# Patient Record
Sex: FEMALE | Race: WHITE | Employment: OTHER
[De-identification: names, ages, dates, MRNs, and addresses within clinical notes are randomized per-mention and may not be internally consistent; named-entity substitution may affect disease eponyms.]

---

## 2017-01-04 ENCOUNTER — TELEPHONE (OUTPATIENT)
Dept: INTERNAL MEDICINE | Facility: CLINIC | Age: 82
End: 2017-01-04

## 2017-01-04 ENCOUNTER — OFFICE VISIT (OUTPATIENT)
Dept: INTERNAL MEDICINE | Facility: CLINIC | Age: 82
End: 2017-01-04

## 2017-01-04 VITALS
HEART RATE: 79 BPM | HEIGHT: 65 IN | BODY MASS INDEX: 19.66 KG/M2 | OXYGEN SATURATION: 97 % | DIASTOLIC BLOOD PRESSURE: 93 MMHG | SYSTOLIC BLOOD PRESSURE: 159 MMHG | WEIGHT: 118 LBS | RESPIRATION RATE: 12 BRPM

## 2017-01-04 DIAGNOSIS — I48.20 CHRONIC ATRIAL FIBRILLATION (HCC): ICD-10-CM

## 2017-01-04 DIAGNOSIS — I10 ESSENTIAL HYPERTENSION: ICD-10-CM

## 2017-01-04 DIAGNOSIS — I50.42 CHRONIC COMBINED SYSTOLIC AND DIASTOLIC CONGESTIVE HEART FAILURE (HCC): Primary | ICD-10-CM

## 2017-01-04 PROCEDURE — 99214 OFFICE O/P EST MOD 30 MIN: CPT | Performed by: INTERNAL MEDICINE

## 2017-01-04 RX ORDER — DIGOXIN 125 MCG
0.06 TABLET ORAL DAILY
Qty: 90 TABLET | Refills: 0
Start: 2017-01-04 | End: 2017-01-27 | Stop reason: DRUGHIGH

## 2017-01-04 ASSESSMENT — ENCOUNTER SYMPTOMS: SHORTNESS OF BREATH: 1

## 2017-01-26 ENCOUNTER — CARE COORDINATION (OUTPATIENT)
Dept: CARE COORDINATION | Facility: CLINIC | Age: 82
End: 2017-01-26

## 2017-01-27 ENCOUNTER — CARE COORDINATION (OUTPATIENT)
Dept: CARE COORDINATION | Facility: CLINIC | Age: 82
End: 2017-01-27

## 2017-01-27 RX ORDER — WARFARIN SODIUM 1 MG/1
1 TABLET ORAL
COMMUNITY
End: 2018-06-06 | Stop reason: ALTCHOICE

## 2017-01-27 RX ORDER — FUROSEMIDE 20 MG/1
20 TABLET ORAL DAILY
COMMUNITY
End: 2017-10-17 | Stop reason: SDUPTHER

## 2017-01-27 RX ORDER — LOSARTAN POTASSIUM 25 MG/1
25 TABLET ORAL DAILY
COMMUNITY
End: 2018-06-06 | Stop reason: ALTCHOICE

## 2017-01-27 RX ORDER — DIGOXIN 125 MCG
125 TABLET ORAL
COMMUNITY
End: 2017-03-14 | Stop reason: SDUPTHER

## 2017-01-30 ENCOUNTER — OFFICE VISIT (OUTPATIENT)
Dept: INTERNAL MEDICINE | Facility: CLINIC | Age: 82
End: 2017-01-30

## 2017-01-30 VITALS
DIASTOLIC BLOOD PRESSURE: 89 MMHG | BODY MASS INDEX: 19.86 KG/M2 | WEIGHT: 119.2 LBS | HEIGHT: 65 IN | HEART RATE: 93 BPM | OXYGEN SATURATION: 98 % | RESPIRATION RATE: 12 BRPM | SYSTOLIC BLOOD PRESSURE: 145 MMHG

## 2017-01-30 DIAGNOSIS — I10 ESSENTIAL HYPERTENSION: ICD-10-CM

## 2017-01-30 DIAGNOSIS — I50.42 CHRONIC COMBINED SYSTOLIC AND DIASTOLIC HEART FAILURE (HCC): Primary | ICD-10-CM

## 2017-01-30 DIAGNOSIS — Z23 NEED FOR PNEUMOCOCCAL VACCINATION: ICD-10-CM

## 2017-01-30 DIAGNOSIS — I48.20 CHRONIC ATRIAL FIBRILLATION (HCC): ICD-10-CM

## 2017-01-30 PROCEDURE — 4040F PNEUMOC VAC/ADMIN/RCVD: CPT | Performed by: INTERNAL MEDICINE

## 2017-01-30 PROCEDURE — G8427 DOCREV CUR MEDS BY ELIG CLIN: HCPCS | Performed by: INTERNAL MEDICINE

## 2017-01-30 PROCEDURE — 1036F TOBACCO NON-USER: CPT | Performed by: INTERNAL MEDICINE

## 2017-01-30 PROCEDURE — G0009 ADMIN PNEUMOCOCCAL VACCINE: HCPCS | Performed by: INTERNAL MEDICINE

## 2017-01-30 PROCEDURE — 90670 PCV13 VACCINE IM: CPT | Performed by: INTERNAL MEDICINE

## 2017-01-30 PROCEDURE — G8484 FLU IMMUNIZE NO ADMIN: HCPCS | Performed by: INTERNAL MEDICINE

## 2017-01-30 PROCEDURE — G8419 CALC BMI OUT NRM PARAM NOF/U: HCPCS | Performed by: INTERNAL MEDICINE

## 2017-01-30 PROCEDURE — 1090F PRES/ABSN URINE INCON ASSESS: CPT | Performed by: INTERNAL MEDICINE

## 2017-01-30 PROCEDURE — 99214 OFFICE O/P EST MOD 30 MIN: CPT | Performed by: INTERNAL MEDICINE

## 2017-01-30 PROCEDURE — 1123F ACP DISCUSS/DSCN MKR DOCD: CPT | Performed by: INTERNAL MEDICINE

## 2017-01-30 ASSESSMENT — ENCOUNTER SYMPTOMS: SHORTNESS OF BREATH: 1

## 2017-02-06 ENCOUNTER — TELEPHONE (OUTPATIENT)
Dept: INTERNAL MEDICINE | Facility: CLINIC | Age: 82
End: 2017-02-06

## 2017-03-14 RX ORDER — DIGOXIN 125 MCG
125 TABLET ORAL
Qty: 30 TABLET | Refills: 3 | Status: SHIPPED | OUTPATIENT
Start: 2017-03-14 | End: 2017-08-21 | Stop reason: SDUPTHER

## 2017-04-04 RX ORDER — FUROSEMIDE 20 MG/1
TABLET ORAL
Qty: 180 TABLET | Refills: 3 | Status: SHIPPED | OUTPATIENT
Start: 2017-04-04 | End: 2018-06-12 | Stop reason: SDUPTHER

## 2017-04-18 ENCOUNTER — TELEPHONE (OUTPATIENT)
Dept: INTERNAL MEDICINE | Age: 82
End: 2017-04-18

## 2017-05-01 ENCOUNTER — OFFICE VISIT (OUTPATIENT)
Dept: INTERNAL MEDICINE | Age: 82
End: 2017-05-01
Payer: MEDICARE

## 2017-05-01 VITALS
WEIGHT: 118.2 LBS | HEART RATE: 91 BPM | RESPIRATION RATE: 12 BRPM | BODY MASS INDEX: 19.69 KG/M2 | OXYGEN SATURATION: 100 % | HEIGHT: 65 IN | DIASTOLIC BLOOD PRESSURE: 83 MMHG | SYSTOLIC BLOOD PRESSURE: 130 MMHG

## 2017-05-01 DIAGNOSIS — I48.20 CHRONIC ATRIAL FIBRILLATION (HCC): ICD-10-CM

## 2017-05-01 DIAGNOSIS — I10 ESSENTIAL HYPERTENSION: ICD-10-CM

## 2017-05-01 DIAGNOSIS — I50.42 CHRONIC COMBINED SYSTOLIC AND DIASTOLIC CONGESTIVE HEART FAILURE (HCC): ICD-10-CM

## 2017-05-01 PROCEDURE — 4040F PNEUMOC VAC/ADMIN/RCVD: CPT | Performed by: INTERNAL MEDICINE

## 2017-05-01 PROCEDURE — 1123F ACP DISCUSS/DSCN MKR DOCD: CPT | Performed by: INTERNAL MEDICINE

## 2017-05-01 PROCEDURE — 1036F TOBACCO NON-USER: CPT | Performed by: INTERNAL MEDICINE

## 2017-05-01 PROCEDURE — G8427 DOCREV CUR MEDS BY ELIG CLIN: HCPCS | Performed by: INTERNAL MEDICINE

## 2017-05-01 PROCEDURE — 99214 OFFICE O/P EST MOD 30 MIN: CPT | Performed by: INTERNAL MEDICINE

## 2017-05-01 PROCEDURE — 1090F PRES/ABSN URINE INCON ASSESS: CPT | Performed by: INTERNAL MEDICINE

## 2017-05-01 PROCEDURE — G8419 CALC BMI OUT NRM PARAM NOF/U: HCPCS | Performed by: INTERNAL MEDICINE

## 2017-05-01 ASSESSMENT — PATIENT HEALTH QUESTIONNAIRE - PHQ9
1. LITTLE INTEREST OR PLEASURE IN DOING THINGS: 0
SUM OF ALL RESPONSES TO PHQ QUESTIONS 1-9: 0
SUM OF ALL RESPONSES TO PHQ9 QUESTIONS 1 & 2: 0
2. FEELING DOWN, DEPRESSED OR HOPELESS: 0

## 2017-05-01 ASSESSMENT — ENCOUNTER SYMPTOMS: SHORTNESS OF BREATH: 1

## 2017-05-23 RX ORDER — WARFARIN SODIUM 1 MG/1
TABLET ORAL
Qty: 180 TABLET | Refills: 0 | Status: SHIPPED | OUTPATIENT
Start: 2017-05-23 | End: 2017-07-13 | Stop reason: SDUPTHER

## 2017-07-13 ENCOUNTER — OFFICE VISIT (OUTPATIENT)
Dept: INTERNAL MEDICINE | Age: 82
End: 2017-07-13
Payer: MEDICARE

## 2017-07-13 VITALS
DIASTOLIC BLOOD PRESSURE: 74 MMHG | OXYGEN SATURATION: 78 % | HEART RATE: 90 BPM | WEIGHT: 118 LBS | BODY MASS INDEX: 19.64 KG/M2 | SYSTOLIC BLOOD PRESSURE: 116 MMHG

## 2017-07-13 DIAGNOSIS — I10 ESSENTIAL HYPERTENSION: Primary | ICD-10-CM

## 2017-07-13 DIAGNOSIS — L72.0 KERATINOUS CYST: ICD-10-CM

## 2017-07-13 PROCEDURE — G8420 CALC BMI NORM PARAMETERS: HCPCS | Performed by: INTERNAL MEDICINE

## 2017-07-13 PROCEDURE — 4040F PNEUMOC VAC/ADMIN/RCVD: CPT | Performed by: INTERNAL MEDICINE

## 2017-07-13 PROCEDURE — 99214 OFFICE O/P EST MOD 30 MIN: CPT | Performed by: INTERNAL MEDICINE

## 2017-07-13 PROCEDURE — G8427 DOCREV CUR MEDS BY ELIG CLIN: HCPCS | Performed by: INTERNAL MEDICINE

## 2017-07-13 PROCEDURE — 1090F PRES/ABSN URINE INCON ASSESS: CPT | Performed by: INTERNAL MEDICINE

## 2017-07-13 PROCEDURE — 1123F ACP DISCUSS/DSCN MKR DOCD: CPT | Performed by: INTERNAL MEDICINE

## 2017-07-13 PROCEDURE — 1036F TOBACCO NON-USER: CPT | Performed by: INTERNAL MEDICINE

## 2017-07-13 RX ORDER — BLOOD PRESSURE TEST KIT
KIT MISCELLANEOUS
Qty: 1 KIT | Refills: 0 | Status: SHIPPED | OUTPATIENT
Start: 2017-07-13 | End: 2019-04-15

## 2017-08-22 RX ORDER — DIGOXIN 125 MCG
TABLET ORAL
Qty: 30 TABLET | Refills: 5 | Status: SHIPPED | OUTPATIENT
Start: 2017-08-22 | End: 2018-05-15 | Stop reason: SDUPTHER

## 2017-10-17 ENCOUNTER — OFFICE VISIT (OUTPATIENT)
Dept: INTERNAL MEDICINE | Age: 82
End: 2017-10-17
Payer: MEDICARE

## 2017-10-17 VITALS
OXYGEN SATURATION: 97 % | DIASTOLIC BLOOD PRESSURE: 77 MMHG | HEART RATE: 78 BPM | SYSTOLIC BLOOD PRESSURE: 138 MMHG | BODY MASS INDEX: 19.86 KG/M2 | RESPIRATION RATE: 12 BRPM | HEIGHT: 65 IN | WEIGHT: 119.2 LBS

## 2017-10-17 DIAGNOSIS — I10 ESSENTIAL HYPERTENSION: ICD-10-CM

## 2017-10-17 DIAGNOSIS — I48.20 CHRONIC ATRIAL FIBRILLATION (HCC): ICD-10-CM

## 2017-10-17 DIAGNOSIS — I50.42 CHRONIC COMBINED SYSTOLIC AND DIASTOLIC CONGESTIVE HEART FAILURE (HCC): ICD-10-CM

## 2017-10-17 DIAGNOSIS — Z23 NEED FOR PROPHYLACTIC VACCINATION AND INOCULATION AGAINST INFLUENZA: Primary | ICD-10-CM

## 2017-10-17 PROCEDURE — 1090F PRES/ABSN URINE INCON ASSESS: CPT | Performed by: INTERNAL MEDICINE

## 2017-10-17 PROCEDURE — 90686 IIV4 VACC NO PRSV 0.5 ML IM: CPT | Performed by: INTERNAL MEDICINE

## 2017-10-17 PROCEDURE — G8427 DOCREV CUR MEDS BY ELIG CLIN: HCPCS | Performed by: INTERNAL MEDICINE

## 2017-10-17 PROCEDURE — 1123F ACP DISCUSS/DSCN MKR DOCD: CPT | Performed by: INTERNAL MEDICINE

## 2017-10-17 PROCEDURE — 4040F PNEUMOC VAC/ADMIN/RCVD: CPT | Performed by: INTERNAL MEDICINE

## 2017-10-17 PROCEDURE — G8484 FLU IMMUNIZE NO ADMIN: HCPCS | Performed by: INTERNAL MEDICINE

## 2017-10-17 PROCEDURE — 1036F TOBACCO NON-USER: CPT | Performed by: INTERNAL MEDICINE

## 2017-10-17 PROCEDURE — G8420 CALC BMI NORM PARAMETERS: HCPCS | Performed by: INTERNAL MEDICINE

## 2017-10-17 PROCEDURE — G0008 ADMIN INFLUENZA VIRUS VAC: HCPCS | Performed by: INTERNAL MEDICINE

## 2017-10-17 PROCEDURE — 99214 OFFICE O/P EST MOD 30 MIN: CPT | Performed by: INTERNAL MEDICINE

## 2017-10-17 RX ORDER — HYDROCODONE BITARTRATE AND ACETAMINOPHEN 5; 325 MG/1; MG/1
1 TABLET ORAL EVERY 6 HOURS PRN
Qty: 120 TABLET | Refills: 0 | Status: SHIPPED | OUTPATIENT
Start: 2017-10-17

## 2017-10-17 ASSESSMENT — ENCOUNTER SYMPTOMS
SHORTNESS OF BREATH: 1
ALLERGIC/IMMUNOLOGIC NEGATIVE: 1
EYES NEGATIVE: 1

## 2017-10-17 NOTE — PROGRESS NOTES
1 drop into both eyes 2 times daily.  warfarin (COUMADIN) 1 MG tablet TAKE TWO TABLETS BY MOUTH DAILY 180 tablet 0    losartan (COZAAR) 25 MG tablet Take 25 mg by mouth daily      warfarin (COUMADIN) 1 MG tablet Take 1 mg by mouth Daily with supper Dosing per Dr Veronique Ruano      Omeprazole 20 MG TBEC Take 20 mg by mouth daily. 30 tablet 3     No current facility-administered medications for this visit. Allergies   Allergen Reactions    Adhesive Tape Hives    Asa [Aspirin]     Sulfa Antibiotics        Health Maintenance   Topic Date Due    DTaP/Tdap/Td vaccine (1 - Tdap) 01/30/2018 (Originally 8/19/1938)    Zostavax vaccine  Addressed    Flu vaccine  Completed    Pneumococcal low/med risk  Completed       Controlled Substances Monitoring:      HPI:     Hypertension   This is a chronic problem. The current episode started more than 1 year ago. The problem is unchanged. The problem is controlled. Associated symptoms include shortness of breath. There are no associated agents to hypertension. Risk factors for coronary artery disease include dyslipidemia. Past treatments include beta blockers and diuretics. The current treatment provides significant improvement. There are no compliance problems. Hypertensive end-organ damage includes heart failure. Congestive Heart Failure   The disease course has been stable. Associated symptoms include shortness of breath. The symptoms have been stable. Past treatments include digoxin, salt and fluid restriction and beta blockers. The treatment provided moderate relief. Compliance with prior treatments has been good. Her past medical history is significant for arrhythmia (atrial fibrillation) and CAD. ROS:     Review of Systems   Constitutional: Negative. Eyes: Negative. Respiratory: Positive for shortness of breath. Endocrine: Negative. Allergic/Immunologic: Negative. All other systems reviewed and are negative.       Objective:     Physical (1.651 m)   Wt 119 lb 3.2 oz (54.1 kg)   SpO2 97%   Breastfeeding? No   BMI 19.84 kg/m²     Assessment:      1. Need for prophylactic vaccination and inoculation against influenza  INFLUENZA, QUADV, 3 YRS AND OLDER, IM, PF, PREFILL SYR OR SDV, 0.5ML (FLUZONE QUADV, PF)    GA IMMUNIZ ADMIN,1 SINGLE/COMB VAC/TOXOID   2. Essential hypertension     3. Chronic atrial fibrillation ([facility] [address]     4. Chronic combined systolic and diastolic congestive heart failure ([facility] [address]         Plan:       1. Mary Jane Malik received counseling on the following healthy behaviors: medication adherence    2. Prior labs and health maintenance reviewed. 3.  Discussed use, benefit, and side effects of prescribed medications. Barriers to medication compliance addressed. All her questions were answered. Pt voiced understanding. Mary Jane Malik will continue current medications, diet and exercise. Orders Placed This Encounter   Medications    HYDROcodone-acetaminophen (NORCO) 5-325 MG per tablet     Sig: Take 1 tablet by mouth every 6 hours as needed for Pain . Dispense:  120 tablet     Refill:  0          Completed Refills               Requested Prescriptions     Signed Prescriptions Disp Refills    HYDROcodone-acetaminophen (NORCO) 5-325 MG per tablet 120 tablet 0     Sig: Take 1 tablet by mouth every 6 hours as needed for Pain . 4. Patient given educational materials - see patient instructions    5. Was a self-tracking handout given in paper form or via Bybanhart? No  If yes, see orders or list here. Orders Placed This Encounter   Procedures    INFLUENZA, QUADV, 3 YRS AND OLDER, IM, PF, PREFILL SYR OR SDV, 0.5ML (FLUZONE QUADV, PF)    GA IMMUNIZ ADMIN,1 SINGLE/COMB VAC/TOXOID       Return in about 3 months (around 1/17/2018). Patient agreed with treatment plan.     Electronically signed by Colby Hamilton MD on 10/17/2017 at 2:08 PM

## 2018-01-16 ENCOUNTER — HOSPITAL ENCOUNTER (EMERGENCY)
Age: 83
Discharge: HOME | End: 2018-01-16
Attending: EMERGENCY MEDICINE
Payer: MEDICARE

## 2018-01-16 ENCOUNTER — APPOINTMENT (OUTPATIENT)
Dept: CT IMAGING | Age: 83
End: 2018-01-16
Payer: MEDICARE

## 2018-01-16 ENCOUNTER — APPOINTMENT (OUTPATIENT)
Dept: GENERAL RADIOLOGY | Age: 83
End: 2018-01-16
Payer: MEDICARE

## 2018-01-16 VITALS
TEMPERATURE: 96.8 F | OXYGEN SATURATION: 99 % | DIASTOLIC BLOOD PRESSURE: 85 MMHG | WEIGHT: 120 LBS | BODY MASS INDEX: 19.29 KG/M2 | SYSTOLIC BLOOD PRESSURE: 180 MMHG | HEART RATE: 88 BPM | RESPIRATION RATE: 13 BRPM | HEIGHT: 66 IN

## 2018-01-16 DIAGNOSIS — I50.9 ACUTE ON CHRONIC CONGESTIVE HEART FAILURE, UNSPECIFIED CONGESTIVE HEART FAILURE TYPE: ICD-10-CM

## 2018-01-16 DIAGNOSIS — R60.0 PEDAL EDEMA: Primary | ICD-10-CM

## 2018-01-16 LAB
-: NORMAL
ABSOLUTE EOS #: 0.06 K/UL (ref 0–0.44)
ABSOLUTE IMMATURE GRANULOCYTE: 0.03 K/UL (ref 0–0.3)
ABSOLUTE LYMPH #: 1.01 K/UL (ref 1.1–3.7)
ABSOLUTE MONO #: 0.73 K/UL (ref 0.1–1.2)
AMORPHOUS: NORMAL
ANION GAP SERPL CALCULATED.3IONS-SCNC: 10 MMOL/L (ref 9–17)
BACTERIA: NORMAL
BASOPHILS # BLD: 1 % (ref 0–2)
BASOPHILS ABSOLUTE: 0.04 K/UL (ref 0–0.2)
BILIRUBIN URINE: NEGATIVE
BNP INTERPRETATION: ABNORMAL
BUN BLDV-MCNC: 31 MG/DL (ref 8–23)
BUN/CREAT BLD: ABNORMAL (ref 9–20)
CALCIUM SERPL-MCNC: 9.3 MG/DL (ref 8.6–10.4)
CASTS UA: NORMAL /LPF (ref 0–8)
CHLORIDE BLD-SCNC: 101 MMOL/L (ref 98–107)
CO2: 27 MMOL/L (ref 20–31)
COLOR: YELLOW
COMMENT UA: ABNORMAL
CREAT SERPL-MCNC: 1.06 MG/DL (ref 0.5–0.9)
CRYSTALS, UA: NORMAL /HPF
DIFFERENTIAL TYPE: ABNORMAL
EKG ATRIAL RATE: 129 BPM
EKG Q-T INTERVAL: 358 MS
EKG QRS DURATION: 88 MS
EKG QTC CALCULATION (BAZETT): 428 MS
EKG R AXIS: -33 DEGREES
EKG T AXIS: 32 DEGREES
EKG VENTRICULAR RATE: 86 BPM
EOSINOPHILS RELATIVE PERCENT: 1 % (ref 1–4)
EPITHELIAL CELLS UA: NORMAL /HPF (ref 0–5)
GFR AFRICAN AMERICAN: 58 ML/MIN
GFR NON-AFRICAN AMERICAN: 48 ML/MIN
GFR SERPL CREATININE-BSD FRML MDRD: ABNORMAL ML/MIN/{1.73_M2}
GFR SERPL CREATININE-BSD FRML MDRD: ABNORMAL ML/MIN/{1.73_M2}
GLUCOSE BLD-MCNC: 99 MG/DL (ref 70–99)
GLUCOSE URINE: NEGATIVE
HCT VFR BLD CALC: 43.4 % (ref 36.3–47.1)
HEMOGLOBIN: 13.4 G/DL (ref 11.9–15.1)
IMMATURE GRANULOCYTES: 1 %
KETONES, URINE: NEGATIVE
LEUKOCYTE ESTERASE, URINE: ABNORMAL
LYMPHOCYTES # BLD: 16 % (ref 24–43)
MCH RBC QN AUTO: 30.6 PG (ref 25.2–33.5)
MCHC RBC AUTO-ENTMCNC: 30.9 G/DL (ref 28.4–34.8)
MCV RBC AUTO: 99.1 FL (ref 82.6–102.9)
MONOCYTES # BLD: 12 % (ref 3–12)
MUCUS: NORMAL
NITRITE, URINE: NEGATIVE
NRBC AUTOMATED: 0 PER 100 WBC
OTHER OBSERVATIONS UA: NORMAL
PDW BLD-RTO: 14.3 % (ref 11.8–14.4)
PH UA: 7 (ref 5–8)
PLATELET # BLD: 236 K/UL (ref 138–453)
PLATELET ESTIMATE: ABNORMAL
PMV BLD AUTO: 9.9 FL (ref 8.1–13.5)
POC TROPONIN I: 0.02 NG/ML (ref 0–0.1)
POC TROPONIN I: 0.02 NG/ML (ref 0–0.1)
POC TROPONIN INTERP: NORMAL
POC TROPONIN INTERP: NORMAL
POTASSIUM SERPL-SCNC: 4 MMOL/L (ref 3.7–5.3)
PRO-BNP: 3653 PG/ML
PROTEIN UA: NEGATIVE
RBC # BLD: 4.38 M/UL (ref 3.95–5.11)
RBC # BLD: ABNORMAL 10*6/UL
RBC UA: NORMAL /HPF (ref 0–4)
RENAL EPITHELIAL, UA: NORMAL /HPF
SEG NEUTROPHILS: 69 % (ref 36–65)
SEGMENTED NEUTROPHILS ABSOLUTE COUNT: 4.4 K/UL (ref 1.5–8.1)
SODIUM BLD-SCNC: 138 MMOL/L (ref 135–144)
SPECIFIC GRAVITY UA: 1.01 (ref 1–1.03)
TRICHOMONAS: NORMAL
TSH SERPL DL<=0.05 MIU/L-ACNC: 2.35 MIU/L (ref 0.3–5)
TURBIDITY: CLEAR
URINE HGB: NEGATIVE
UROBILINOGEN, URINE: NORMAL
WBC # BLD: 6.3 K/UL (ref 3.5–11.3)
WBC # BLD: ABNORMAL 10*3/UL
WBC UA: NORMAL /HPF (ref 0–5)
YEAST: NORMAL

## 2018-01-16 PROCEDURE — 84484 ASSAY OF TROPONIN QUANT: CPT

## 2018-01-16 PROCEDURE — 80048 BASIC METABOLIC PNL TOTAL CA: CPT

## 2018-01-16 PROCEDURE — 85025 COMPLETE CBC W/AUTO DIFF WBC: CPT

## 2018-01-16 PROCEDURE — 87086 URINE CULTURE/COLONY COUNT: CPT

## 2018-01-16 PROCEDURE — 87186 SC STD MICRODIL/AGAR DIL: CPT

## 2018-01-16 PROCEDURE — 81001 URINALYSIS AUTO W/SCOPE: CPT

## 2018-01-16 PROCEDURE — 70450 CT HEAD/BRAIN W/O DYE: CPT

## 2018-01-16 PROCEDURE — 71045 X-RAY EXAM CHEST 1 VIEW: CPT

## 2018-01-16 PROCEDURE — 99284 EMERGENCY DEPT VISIT MOD MDM: CPT

## 2018-01-16 PROCEDURE — 6360000002 HC RX W HCPCS: Performed by: STUDENT IN AN ORGANIZED HEALTH CARE EDUCATION/TRAINING PROGRAM

## 2018-01-16 PROCEDURE — 93005 ELECTROCARDIOGRAM TRACING: CPT

## 2018-01-16 PROCEDURE — 87077 CULTURE AEROBIC IDENTIFY: CPT

## 2018-01-16 PROCEDURE — 83880 ASSAY OF NATRIURETIC PEPTIDE: CPT

## 2018-01-16 PROCEDURE — 84443 ASSAY THYROID STIM HORMONE: CPT

## 2018-01-16 PROCEDURE — 96374 THER/PROPH/DIAG INJ IV PUSH: CPT

## 2018-01-16 RX ORDER — FUROSEMIDE 10 MG/ML
20 INJECTION INTRAMUSCULAR; INTRAVENOUS ONCE
Status: COMPLETED | OUTPATIENT
Start: 2018-01-16 | End: 2018-01-16

## 2018-01-16 RX ADMIN — FUROSEMIDE 20 MG: 10 INJECTION, SOLUTION INTRAMUSCULAR; INTRAVENOUS at 13:55

## 2018-01-16 ASSESSMENT — ENCOUNTER SYMPTOMS
NAUSEA: 0
CHEST TIGHTNESS: 0
BACK PAIN: 0
SHORTNESS OF BREATH: 0
TROUBLE SWALLOWING: 0
PHOTOPHOBIA: 0
ABDOMINAL PAIN: 0
COUGH: 0
WHEEZING: 0
SORE THROAT: 0
VOMITING: 0

## 2018-01-16 NOTE — ED PROVIDER NOTES
[id number]     Emergency Department     Faculty Attestation    I performed a history and physical examination of the patient and discussed management with the resident. I have reviewed and agree with the residents findings including all diagnostic interpretations, and treatment plans as written. Any areas of disagreement are noted on the chart. I was personally present for the key portions of any procedures. I have documented in the chart those procedures where I was not present during the key portions. I have reviewed the emergency nurses triage note. I agree with the chief complaint, past medical history, past surgical history, allergies, medications, social and family history as documented unless otherwise noted below. Documentation of the HPI, Physical Exam and Medical Decision Making performed by scribwil is based on my personal performance of the HPI, PE and MDM. For Physician Assistant/ Nurse Practitioner cases/documentation I have personally evaluated this patient and have completed at least one if not all key elements of the E/M (history, physical exam, and MDM). Additional findings are as noted. Primary Care Physician: Jocelyn Todd MD    History: This is a 80 y.o. female who presents to the Emergency Department with complaint of Foot pain and swelling. This been ongoing for last year but worse over the last week. The patient denies any chest pain shortness of breath headache numbness tingling. The patient does complain of generalized fatigue. Physical:   height is 5' 6\" (1.676 m) and weight is 120 lb (54.4 kg). Her oral temperature is 96.8 °F (36 °C). Her blood pressure is 170/97 (abnormal) and her pulse is 72. Her respiration is 18 and oxygen saturation is 98%. There is some edema noted in the right foot. There is no erythema or warmth noted.     Impression: Fatigue, edema in the foot    Plan: CBC, EKG, troponin, BMP,

## 2018-01-16 NOTE — ED PROVIDER NOTES
[facility] ED  Emergency Department Encounter  Emergency Medicine Resident     Pt Name: Woody Kearney  MRN: [id number]  [place] 8/19/1919  Date of evaluation: 1/16/18  PCP:  Larissa Sandoval MD    CHIEF COMPLAINT       Chief Complaint   Patient presents with    Fatigue     Pts niece reports pt c/o generalized weakness. Pt with history of atrial fibrillation and CHF. Pt was evaluated by EMS pta, whom did not feel pt needed to be transported to ED emergently.  Foot Pain     Pt with c/o right foot pain intermittently for the past year, worse the past several weeks. HISTORY OF PRESENT ILLNESS  (Location/Symptom, Timing/Onset, Context/Setting, Quality, Duration, Modifying Factors, Severity.)      Woody Kearney is a 80 y.o. female who presents with Generalized weakness, right ankle pain, bilateral lower extremity swelling. Patient has history of congestive heart failure. States that she's been taking Lasix as needed, right ankle is painful due to swelling. There are no lesions, increased swelling, warmth. Patient does not have any fevers. Patient denies any shortness of breath, cough. She states that she has been able to get around appropriately, no falls. Patient was recently placed on Eliquis for her chronic A. fib. She denies any dizziness, syncope. Patient states that she has not taken her medications today. On exam she is alert and oriented ×3, denies any headache, vomiting, fevers, diarrhea. PAST MEDICAL / SURGICAL / SOCIAL / FAMILY HISTORY      has a past medical history of AF (atrial fibrillation) ([address] Glaucoma; and Hypertension. has a past surgical history that includes Lumbar spine surgery. Social History     Social History    Marital status:      Spouse name: N/A    Number of children: N/A    Years of education: N/A     Occupational History    Not on file.      Social History Main Topics    Smoking status: Never Smoker    Smokeless tobacco: Never Used    Alcohol use Yes    Drug use: No    Sexual activity: Not on file     Other Topics Concern    Not on file     Social History Narrative    No narrative on file       History reviewed. No pertinent family history. Allergies:  Adhesive tape; Asa [aspirin]; and Sulfa antibiotics    Home Medications:  Prior to Admission medications    Medication Sig Start Date End Date Taking? Authorizing Provider   apixaban (ELIQUIS) 5 MG TABS tablet Take 2.5 mg by mouth 2 times daily    Yes Historical Provider, MD   HYDROcodone-acetaminophen (NORCO) 5-325 MG per tablet Take 1 tablet by mouth every 6 hours as needed for Pain . 10/17/17  Yes Zaria Solis MD   digoxin (LANOXIN) 125 MCG tablet TAKE ONE TABLET BY MOUTH ON Gwenetta Makos AND SATURDAY 8/22/17  Yes Zaria Solis MD   furosemide (LASIX) 20 MG tablet TAKE TWO TABLETS BY MOUTH DAILY 4/4/17  Yes Zaria Solis MD   metoprolol tartrate (LOPRESSOR) 25 MG tablet Take 12.5 mg by mouth 2 times daily Take 1/2 a tab in the am and 1/2 a tablet in the pm 1/25/17  Yes Frandy Hernandez MD   sertraline (ZOLOFT) 25 MG tablet TAKE TWO TABLETS BY MOUTH ONCE DAILY 12/20/16  Yes Zaria Solis MD   bimatoprost (LUMIGAN) 0.01 % SOLN ophthalmic drops Place 1 drop into both eyes nightly. Yes Historical Provider, MD   dorzolamide (TRUSOPT) 2 % ophthalmic solution Place 1 drop into both eyes 2 times daily.    Yes Historical Provider, MD   warfarin (COUMADIN) 1 MG tablet TAKE TWO TABLETS BY MOUTH DAILY 10/11/17   Zaria Solis MD   metoprolol tartrate (LOPRESSOR) 25 MG tablet TAKE ONE TABLET BY MOUTH TWICE DAILY 8/1/17   Zaria Solis MD   Blood Pressure KIT Check weekly 7/13/17   Zaria Solis MD   losartan (COZAAR) 25 MG tablet Take 25 mg by mouth daily    Frandy Hernandez MD   warfarin (COUMADIN) 1 MG tablet Take 1 mg by mouth Daily with supper Dosing per Dr Darinel Mathew MD   Omeprazole 20 MG TBEC Take 20 mg She is alert and oriented to person, place, and time. She has normal reflexes. Skin: Skin is warm and dry. No rash noted. No erythema. Psychiatric: She has a normal mood and affect.  Her behavior is normal.       DIFFERENTIAL  DIAGNOSIS     PLAN (LABS / IMAGING / EKG):  Orders Placed This Encounter   Procedures    XR CHEST PORTABLE    CT Head WO Contrast    Basic Metabolic Panel    Brain Natriuretic Peptide    CBC Auto Differential    TSH without Reflex    UA W/REFLEX CULTURE    Microscopic Urinalysis    Continuous Pulse Oximetry    Inpatient consult to Primary Care Provider    POCT troponin    POCT troponin    EKG 12 Lead    Insert peripheral IV       MEDICATIONS ORDERED:  Orders Placed This Encounter   Medications    furosemide (LASIX) injection 20 mg       DDX: ACS/acute exacerbation of CHF/fluid overload/viral syndrome    DIAGNOSTIC RESULTS / EMERGENCY DEPARTMENT COURSE / MDM     LABS:  Results for orders placed or performed during the hospital encounter of 76/11/12   Basic Metabolic Panel   Result Value Ref Range    Glucose 99 70 - 99 mg/dL    BUN 31 (H) 8 - 23 mg/dL    CREATININE 1.06 (H) 0.50 - 0.90 mg/dL    Bun/Cre Ratio NOT REPORTED 9 - 20    Calcium 9.3 8.6 - 10.4 mg/dL    Sodium 138 135 - 144 mmol/L    Potassium 4.0 3.7 - 5.3 mmol/L    Chloride 101 98 - 107 mmol/L    CO2 27 20 - 31 mmol/L    Anion Gap 10 9 - 17 mmol/L    GFR Non-African American 48 (L) >60 mL/min    GFR  58 (L) >60 mL/min    GFR Comment          GFR Staging NOT REPORTED    Brain Natriuretic Peptide   Result Value Ref Range    Pro-BNP 3,653 (H) <300 pg/mL    BNP Interpretation         CBC Auto Differential   Result Value Ref Range    WBC 6.3 3.5 - 11.3 k/uL    RBC 4.38 3.95 - 5.11 m/uL    Hemoglobin 13.4 11.9 - 15.1 g/dL    Hematocrit 43.4 36.3 - 47.1 %    MCV 99.1 82.6 - 102.9 fL    MCH 30.6 25.2 - 33.5 pg    MCHC 30.9 28.4 - 34.8 g/dL    RDW 14.3 11.8 - 14.4 %    Platelets 089 637 - 434 k/uL    MPV 9.9 8.1 - 13.5 fL    NRBC Automated 0.0 0.0 per 100 WBC    Differential Type NOT REPORTED     Seg Neutrophils 69 (H) 36 - 65 %    Lymphocytes 16 (L) 24 - 43 %    Monocytes 12 3 - 12 %    Eosinophils % 1 1 - 4 %    Basophils 1 0 - 2 %    Immature Granulocytes 1 (H) 0 %    Segs Absolute 4.40 1.50 - 8.10 k/uL    Absolute Lymph # 1.01 (L) 1.10 - 3.70 k/uL    Absolute Mono # 0.73 0.10 - 1.20 k/uL    Absolute Eos # 0.06 0.00 - 0.44 k/uL    Basophils # 0.04 0.00 - 0.20 k/uL    Absolute Immature Granulocyte 0.03 0.00 - 0.30 k/uL    WBC Morphology NOT REPORTED     RBC Morphology NOT REPORTED     Platelet Estimate NOT REPORTED    TSH without Reflex   Result Value Ref Range    TSH 2.35 0.30 - 5.00 mIU/L   UA W/REFLEX CULTURE   Result Value Ref Range    Color, UA YELLOW YEL    Turbidity UA CLEAR CLEAR    Glucose, Ur NEGATIVE NEG    Bilirubin Urine NEGATIVE NEG    Ketones, Urine NEGATIVE NEG    Specific Gravity, UA 1.008 1.005 - 1.030    Urine Hgb NEGATIVE NEG    pH, UA 7.0 5.0 - 8.0    Protein, UA NEGATIVE NEG    Urobilinogen, Urine Normal NORM    Nitrite, Urine NEGATIVE NEG    Leukocyte Esterase, Urine SMALL (A) NEG    Urinalysis Comments NOT REPORTED    Microscopic Urinalysis   Result Value Ref Range    -          WBC, UA 10 TO 20 0 - 5 /HPF    RBC, UA 2 TO 5 0 - 4 /HPF    Casts UA 0 TO 2 HYALINE 0 - 8 /LPF    Crystals UA NOT REPORTED NONE /HPF    Epithelial Cells UA 0 TO 2 0 - 5 /HPF    Renal Epithelial, Urine NOT REPORTED 0 /HPF    Bacteria, UA NOT REPORTED NONE    Mucus, UA NOT REPORTED NONE    Trichomonas, UA NOT REPORTED NONE    Amorphous, UA NOT REPORTED NONE    Other Observations UA NOT REPORTED NREQ    Yeast, UA NOT REPORTED NONE   POCT troponin   Result Value Ref Range    POC Troponin I 0.02 0.00 - 0.10 ng/mL    POC Troponin Interp       The Troponin-I (POC) results cannot be compared to the Troponin-T results.    POCT troponin   Result Value Ref Range    POC Troponin I 0.02 0.00 - 0.10 ng/mL    POC Troponin Interp The Troponin-I (POC) results cannot be compared to the Troponin-T results. RADIOLOGY:  None    EKG  None    All EKG's are interpreted by the Emergency Department Physician who either signs or Co-signs this chart in the absence of a cardiologist.    EMERGENCY DEPARTMENT COURSE:  Patient with multiple comorbidities including CHF, A. fib. Patient currently in A. fib, EKG otherwise is nondiagnostic. Patient has moderate bilateral lower extremity edema that is pitting, limited to ankles. Patient has no crackles, no increased work of breathing, saturations are normal on lung exam.  We'll order troponins, CBC, BMP, BNP, chest x-ray. Due to patient's recent placement on Eliquis and complaining of generalized weakness will order head CT without contrast.  Low concern for any stroke symptoms, no unilateral weakness, however niece at bedside states that her speech was slightly slurred when she was talking to her earlier today. Otherwise, patient is alert and oriented ×3, at baseline mentally, no focal deficits. Patient's BNP is elevated at 3000. BUNs slightly elevated, creatinine within normal range, suspect intravascular depletion. Chest x-ray not showing any acute ulnar due to. Patient is breathing comfortably, sats are within normal range. Spoke with PCP, Dr. Magnus Kearns, is okay with giving 1 dose of IV Lasix today and increasing dose outpatient for a few days until follow-up. CT brain still pending. CT unremarkable. Patient appears well, no acute distress, vital signs remain stable. Administered 1 dose of 40 mg Lasix today. Instructed patient to take 2 Lasix pills at home for 2 days and then resume normal regimen after that. Patient will follow-up with Dr. Magnus Kearns within 2 days. Spoke with Dr. Magnus Kearns, he is aware of her condition and agrees with treatment plan.   Instructed patient return if symptoms worsen or persist.        PROCEDURES:  None    CONSULTS:  IP CONSULT TO PRIMARY CARE PROVIDER    CRITICAL

## 2018-01-16 NOTE — ED NOTES
Writer assists pt to bathroom to obtain urine sample. Urine hat placed in toilet. Urine did not make it into hat. Pt assisted back to cot and placed back on monitor.       Magda Suresh RN  01/16/18 [id number]

## 2018-01-18 LAB
CULTURE: ABNORMAL
CULTURE: ABNORMAL
Lab: ABNORMAL
ORGANISM: ABNORMAL
SPECIMEN DESCRIPTION: ABNORMAL
STATUS: ABNORMAL

## 2018-01-19 ENCOUNTER — OFFICE VISIT (OUTPATIENT)
Dept: INTERNAL MEDICINE | Age: 83
End: 2018-01-19
Payer: MEDICARE

## 2018-01-19 VITALS
BODY MASS INDEX: 19.03 KG/M2 | HEART RATE: 103 BPM | OXYGEN SATURATION: 96 % | SYSTOLIC BLOOD PRESSURE: 157 MMHG | WEIGHT: 114.2 LBS | HEIGHT: 65 IN | RESPIRATION RATE: 12 BRPM | DIASTOLIC BLOOD PRESSURE: 91 MMHG

## 2018-01-19 DIAGNOSIS — I50.42 HEART FAILURE, SYSTOLIC AND DIASTOLIC, CHRONIC (HCC): ICD-10-CM

## 2018-01-19 DIAGNOSIS — R09.89 OTHER SPECIFIED SYMPTOMS AND SIGNS INVOLVING THE CIRCULATORY AND RESPIRATORY SYSTEMS: ICD-10-CM

## 2018-01-19 DIAGNOSIS — I48.20 CHRONIC ATRIAL FIBRILLATION (HCC): ICD-10-CM

## 2018-01-19 DIAGNOSIS — I50.43 ACUTE ON CHRONIC COMBINED SYSTOLIC AND DIASTOLIC CONGESTIVE HEART FAILURE (HCC): Primary | ICD-10-CM

## 2018-01-19 DIAGNOSIS — R23.0 CYANOSIS: ICD-10-CM

## 2018-01-19 PROCEDURE — 99214 OFFICE O/P EST MOD 30 MIN: CPT | Performed by: INTERNAL MEDICINE

## 2018-01-19 PROCEDURE — 1111F DSCHRG MED/CURRENT MED MERGE: CPT | Performed by: INTERNAL MEDICINE

## 2018-01-19 ASSESSMENT — ENCOUNTER SYMPTOMS
BACK PAIN: 1
GASTROINTESTINAL NEGATIVE: 1
ALLERGIC/IMMUNOLOGIC NEGATIVE: 1
RESPIRATORY NEGATIVE: 1

## 2018-01-19 NOTE — PROGRESS NOTES
[facility] INTERNAL MEDICINE [place] [address] [address]  [address]   [address] [address]  Dept: [phone]  Dept Fax: [phone]    Sarah Monk is a 80 y.o. female who presents today for   Chief Complaint   Patient presents with    Hypertension    and follow up of chronic medical problems:   Patient Active Problem List   Diagnosis    Need for prophylactic vaccination and inoculation against influenza    Essential hypertension    Chronic atrial fibrillation ([address]    Chronic combined systolic and diastolic congestive heart failure ([address]    Gastroesophageal reflux disease without esophagitis    Keratinous cyst   .    Past Medical History:   Diagnosis Date    AF (atrial fibrillation) ([facility] [address]     Glaucoma     Hypertension        Past Surgical History:   Procedure Laterality Date    LUMBAR SPINE SURGERY         No family history on file. Social History   Substance Use Topics    Smoking status: Never Smoker    Smokeless tobacco: Never Used    Alcohol use Yes      Current Outpatient Prescriptions   Medication Sig Dispense Refill    apixaban (ELIQUIS) 5 MG TABS tablet Take 2.5 mg by mouth 2 times daily       HYDROcodone-acetaminophen (NORCO) 5-325 MG per tablet Take 1 tablet by mouth every 6 hours as needed for Pain .  120 tablet 0    warfarin (COUMADIN) 1 MG tablet TAKE TWO TABLETS BY MOUTH DAILY 180 tablet 0    digoxin (LANOXIN) 125 MCG tablet TAKE ONE TABLET BY MOUTH ON MONDAY, TUESDAY,THURSDAY,FRIDAY AND SATURDAY 30 tablet 5    metoprolol tartrate (LOPRESSOR) 25 MG tablet TAKE ONE TABLET BY MOUTH TWICE DAILY 180 tablet 1    Blood Pressure KIT Check weekly 1 kit 0    furosemide (LASIX) 20 MG tablet TAKE TWO TABLETS BY MOUTH DAILY 180 tablet 3    metoprolol tartrate (LOPRESSOR) 25 MG tablet Take 12.5 mg by mouth 2 times daily Take 1/2 a tab in the am and 1/2 a tablet in the pm      losartan (COZAAR) 25 MG tablet Take 25 mg by mouth daily      warfarin (COUMADIN) 1 MG tablet Take 1 mg by mouth Daily with supper Dosing per Dr Mikki Cantu      sertraline (ZOLOFT) 25 MG tablet TAKE TWO TABLETS BY MOUTH ONCE DAILY 180 tablet 5    Omeprazole 20 MG TBEC Take 20 mg by mouth daily. 30 tablet 3    bimatoprost (LUMIGAN) 0.01 % SOLN ophthalmic drops Place 1 drop into both eyes nightly.  dorzolamide (TRUSOPT) 2 % ophthalmic solution Place 1 drop into both eyes 2 times daily. No current facility-administered medications for this visit. Allergies   Allergen Reactions    Adhesive Tape Hives    Asa [Aspirin]     Sulfa Antibiotics        Health Maintenance   Topic Date Due    DTaP/Tdap/Td vaccine (1 - Tdap) 01/30/2018 (Originally 8/19/1938)    Potassium monitoring  01/16/2019    Creatinine monitoring  01/16/2019    Zostavax vaccine  Addressed    Flu vaccine  Completed    Pneumococcal low/med risk  Completed       Controlled Substances Monitoring:      HPI:     HPI    ROS:     Review of Systems   Constitutional: Negative. HENT: Negative. Eyes: Positive for visual disturbance. Respiratory: Negative. Cardiovascular: Positive for leg swelling. Gastrointestinal: Negative. Endocrine: Negative. Genitourinary: Negative. Musculoskeletal: Positive for back pain. Skin: Negative. Allergic/Immunologic: Negative. Neurological: Positive for dizziness and light-headedness. Hematological: Negative. Psychiatric/Behavioral: Negative. Objective:     Physical Exam     Visit Information    Have you changed or started any medications since your last visit including any over-the-counter medicines, vitamins, or herbal medicines? no   Are you having any side effects from any of your medications? -  no  Have you stopped taking any of your medications? Is so, why? -  no    Have you seen any other physician or provider since your last visit? No  Have you had any other diagnostic tests since your last visit?  Yes - Records Obtained  Have you been seen in the emergency room and/or had an admission to a hospital since we last saw you? Yes - Records Obtained  Have you had your routine dental cleaning in the past 6 months? no    Have you activated your Capt[employer]hart account? If not, what are your barriers?  Yes     Patient Care Team:  Jocelyn Todd MD as PCP - Elizabeth Miles MD as PCP - [facility] Attributed Provider  [facility] as Pharmacist  Patricia Myers DPM as Consulting Physician (Podiatry)  [facility]  Maikol Celeste MD as Consulting Physician (Cardiology)    Medical History Review  Past Medical, Family, and Social History reviewed and does contribute to the patient presenting condition    Health Maintenance   Topic Date Due    DTaP/Tdap/Td vaccine (1 - Tdap) 01/30/2018 (Originally 8/19/1938)    Potassium monitoring  01/16/2019    Creatinine monitoring  01/16/2019    Zostavax vaccine  Addressed    Flu vaccine  Completed    Pneumococcal low/med risk  Completed

## 2018-01-19 NOTE — PROGRESS NOTES
Post-Discharge Transitional Care Management Services      Sam Spencer   YOB: 1919    Date of Office Visit:  1/19/2018  Date of Hospital Admission: 1/16/18  Date of Hospital Discharge: 1/16/18  [facility] Risk Score [risk of hospital readmission >=10  medium risk (chance of readmission ~ 12%) >14  high risk (chance of readmission ~18%)]:Risk Score: 1    Care management risk score Rising risk (score 2-5) and Complex Care (Scores >=6): 1       Patient Active Problem List   Diagnosis    Need for prophylactic vaccination and inoculation against influenza    Essential hypertension    Chronic atrial fibrillation (HCC)    Chronic combined systolic and diastolic congestive heart failure (HCC)    Gastroesophageal reflux disease without esophagitis    Keratinous cyst       Allergies   Allergen Reactions    Adhesive Tape Hives    Asa [Aspirin]     Sulfa Antibiotics        Medications listed as ordered at the time of discharge from hospital   Price, [address] [facility] [address] Medication Instructions PERLITA:    Printed on:01/19/18 7970   Medication Information                      apixaban (ELIQUIS) 5 MG TABS tablet  Take 2.5 mg by mouth 2 times daily              bimatoprost (LUMIGAN) 0.01 % SOLN ophthalmic drops  Place 1 drop into both eyes nightly. Blood Pressure KIT  Check weekly             digoxin (LANOXIN) 125 MCG tablet  TAKE ONE TABLET BY MOUTH ON MONDAY, TUESDAY,THURSDAY,FRIDAY AND SATURDAY             dorzolamide (TRUSOPT) 2 % ophthalmic solution  Place 1 drop into both eyes 2 times daily.              furosemide (LASIX) 20 MG tablet  TAKE TWO TABLETS BY MOUTH DAILY             HYDROcodone-acetaminophen (NORCO) 5-325 MG per tablet  Take 1 tablet by mouth every 6 hours as needed for Pain .             losartan (COZAAR) 25 MG tablet  Take 25 mg by mouth daily             metoprolol tartrate (LOPRESSOR) 25 MG tablet  Take 12.5 mg by mouth 2 times daily Take 1/2 a tab in the am and 1/2 a taking as of now reconciled against medications ordered at time of hospital discharge her lasix was increased    Vitals:    01/19/18 1027   BP: (!) 164/95   Pulse: 105   Resp: 12   SpO2: 96%   Weight: 114 lb 3.2 oz (51.8 kg)   Height: 5' 5\" (1.651 m)     Body mass index is 19 kg/m². Wt Readings from Last 3 Encounters:   01/19/18 114 lb 3.2 oz (51.8 kg)   01/16/18 120 lb (54.4 kg)   10/17/17 119 lb 3.2 oz (54.1 kg)     BP Readings from Last 3 Encounters:   01/19/18 (!) 164/95   01/16/18 (!) 180/85   10/17/17 138/77        Inpatient course: Discharge summary reviewed- see chart. Chief Complaint   Patient presents with    Follow-Up from [facility] 1/16/18 for edema, fatigue, foot pain    Foot Swelling     Both Feet x's several weeks, Daughter noticed feet were blue when leaving ED      History of Present illness - Follow up of Hospital diagnosis(es):   CHF      Non face to face  following discharge, date last encounter closed (first attempt may have been earlier): *No documented post hospital discharge outreach found in the last 14 days    Call initiated 2 business days of discharge: *No response recorded in the last 14 days     Interval history/Current status: having more swelling in legs with cyanosis. Pain especially in right foot.     Physical Exam:  General Appearance: alert and oriented to person, place and time, well developed and well- nourished, in no acute distress  Skin: warm and dry, no rash or erythema  Head: normocephalic and atraumatic  Eyes: pupils equal, round, and reactive to light, extraocular eye movements intact, conjunctivae normal  ENT: tympanic membrane, external ear and ear canal normal bilaterally, nose without deformity, nasal mucosa and turbinates normal without polyps  Neck: supple and non-tender without mass, no thyromegaly or thyroid nodules, no cervical lymphadenopathy  Pulmonary/Chest: clear to auscultation bilaterally- no wheezes, rales or rhonchi, normal air movement, no respiratory distress  Cardiovascular: normal rate, regular rhythm, normal S1 and S2, no murmurs, rubs, clicks, or gallops, distal pulses intact, no carotid bruits  Abdomen: soft, non-tender, non-distended, normal bowel sounds, no masses or organomegaly  Extremities: no cyanosis, clubbing or edema  Musculoskeletal: normal range of motion, no joint swelling, deformity or tenderness  Neurologic: reflexes normal and symmetric, no cranial nerve deficit, gait, coordination and speech normal  Cyanosis in feet mid foot toes are cool but not cold. Pulses palpable weaker. Assessment/Plan:  Parris Wheeler was seen today for follow-up from hospital and foot swelling. Diagnoses and all orders for this visit:    Acute on chronic combined systolic and diastolic congestive heart failure (HCC)  -     ND DISCHARGE MEDS RECONCILED W/ CURRENT OUTPATIENT MED LIST  -     Ultrasound doppler arterial legs bilateral; Future    Cyanosis  -     Ultrasound doppler arterial legs bilateral; Future    Other specified symptoms and signs involving the circulatory and respiratory systems   -     Ultrasound doppler arterial legs bilateral; Future          Medical Decision Making: moderate complexity      Quality & Risk Score Accuracy - MEDICARE ADVANTAGE    Visit Dx: Heart failure, systolic and diastolic, chronic ([address]  Worsening based upon symptoms and exam. Appropriate treatment plan in place-continue. Monitoring per progress note/disposition. Visit Dx:  Chronic atrial fibrillation ([address]  Stable based upon review of recent symptoms and physical exam. Continue current treatment plan and follow up at least yearly.   Last edited 01/19/18 11:26 EST by Reina Lawrence MD

## 2018-01-22 ENCOUNTER — TELEPHONE (OUTPATIENT)
Dept: INTERNAL MEDICINE | Age: 83
End: 2018-01-22

## 2018-01-22 ENCOUNTER — HOSPITAL ENCOUNTER (OUTPATIENT)
Dept: VASCULAR LAB | Age: 83
Discharge: HOME | End: 2018-01-22
Payer: MEDICARE

## 2018-01-22 DIAGNOSIS — R23.0 CYANOSIS: ICD-10-CM

## 2018-01-22 DIAGNOSIS — I73.9 PVD (PERIPHERAL VASCULAR DISEASE) (HCC): Primary | ICD-10-CM

## 2018-01-22 DIAGNOSIS — R09.89 OTHER SPECIFIED SYMPTOMS AND SIGNS INVOLVING THE CIRCULATORY AND RESPIRATORY SYSTEMS: ICD-10-CM

## 2018-01-22 DIAGNOSIS — I50.43 ACUTE ON CHRONIC COMBINED SYSTOLIC AND DIASTOLIC CONGESTIVE HEART FAILURE (HCC): ICD-10-CM

## 2018-01-22 PROCEDURE — 93925 LOWER EXTREMITY STUDY: CPT

## 2018-01-23 PROBLEM — I73.9 PVD (PERIPHERAL VASCULAR DISEASE) (HCC): Status: ACTIVE | Noted: 2018-01-23

## 2018-01-24 DIAGNOSIS — I73.9 PVD (PERIPHERAL VASCULAR DISEASE) (HCC): Primary | ICD-10-CM

## 2018-01-25 ENCOUNTER — HOSPITAL ENCOUNTER (OUTPATIENT)
Dept: VASCULAR LAB | Age: 83
Discharge: HOME | End: 2018-01-25
Payer: MEDICARE

## 2018-01-25 DIAGNOSIS — I73.9 PVD (PERIPHERAL VASCULAR DISEASE) (HCC): ICD-10-CM

## 2018-01-25 PROCEDURE — 93923 UPR/LXTR ART STDY 3+ LVLS: CPT

## 2018-01-29 ENCOUNTER — TELEPHONE (OUTPATIENT)
Dept: INTERNAL MEDICINE | Age: 83
End: 2018-01-29

## 2018-02-05 RX ORDER — SERTRALINE HYDROCHLORIDE 25 MG/1
TABLET, FILM COATED ORAL
Qty: 180 TABLET | Refills: 3 | Status: SHIPPED | OUTPATIENT
Start: 2018-02-05 | End: 2019-04-15

## 2018-03-07 ENCOUNTER — OFFICE VISIT (OUTPATIENT)
Dept: INTERNAL MEDICINE | Age: 83
End: 2018-03-07
Payer: MEDICARE

## 2018-03-07 VITALS
HEART RATE: 78 BPM | DIASTOLIC BLOOD PRESSURE: 88 MMHG | HEIGHT: 66 IN | WEIGHT: 119.8 LBS | OXYGEN SATURATION: 98 % | SYSTOLIC BLOOD PRESSURE: 169 MMHG | BODY MASS INDEX: 19.25 KG/M2

## 2018-03-07 DIAGNOSIS — I73.9 CLAUDICATION, INTERMITTENT (HCC): Primary | ICD-10-CM

## 2018-03-07 DIAGNOSIS — N30.00 ACUTE CYSTITIS WITHOUT HEMATURIA: ICD-10-CM

## 2018-03-07 PROCEDURE — G8420 CALC BMI NORM PARAMETERS: HCPCS | Performed by: INTERNAL MEDICINE

## 2018-03-07 PROCEDURE — 99214 OFFICE O/P EST MOD 30 MIN: CPT | Performed by: INTERNAL MEDICINE

## 2018-03-07 PROCEDURE — 81003 URINALYSIS AUTO W/O SCOPE: CPT | Performed by: INTERNAL MEDICINE

## 2018-03-07 PROCEDURE — 4040F PNEUMOC VAC/ADMIN/RCVD: CPT | Performed by: INTERNAL MEDICINE

## 2018-03-07 PROCEDURE — 1123F ACP DISCUSS/DSCN MKR DOCD: CPT | Performed by: INTERNAL MEDICINE

## 2018-03-07 PROCEDURE — G8427 DOCREV CUR MEDS BY ELIG CLIN: HCPCS | Performed by: INTERNAL MEDICINE

## 2018-03-07 PROCEDURE — 1090F PRES/ABSN URINE INCON ASSESS: CPT | Performed by: INTERNAL MEDICINE

## 2018-03-07 PROCEDURE — 1036F TOBACCO NON-USER: CPT | Performed by: INTERNAL MEDICINE

## 2018-03-07 PROCEDURE — G8484 FLU IMMUNIZE NO ADMIN: HCPCS | Performed by: INTERNAL MEDICINE

## 2018-03-07 ASSESSMENT — ENCOUNTER SYMPTOMS
ALLERGIC/IMMUNOLOGIC NEGATIVE: 1
COLOR CHANGE: 1
CONSTIPATION: 1
EYE ITCHING: 1
RESPIRATORY NEGATIVE: 1
EYE PAIN: 1

## 2018-03-07 NOTE — PROGRESS NOTES
(ELIQUIS) 5 MG TABS tablet Take 2.5 mg by mouth 2 times daily       HYDROcodone-acetaminophen (NORCO) 5-325 MG per tablet Take 1 tablet by mouth every 6 hours as needed for Pain . 120 tablet 0    digoxin (LANOXIN) 125 MCG tablet TAKE ONE TABLET BY MOUTH ON MONDAY, TUESDAY,THURSDAY,FRIDAY AND SATURDAY 30 tablet 5    metoprolol tartrate (LOPRESSOR) 25 MG tablet TAKE ONE TABLET BY MOUTH TWICE DAILY 180 tablet 1    Blood Pressure KIT Check weekly 1 kit 0    furosemide (LASIX) 20 MG tablet TAKE TWO TABLETS BY MOUTH DAILY 180 tablet 3    metoprolol tartrate (LOPRESSOR) 25 MG tablet Take 12.5 mg by mouth 2 times daily Take 1/2 a tab in the am and 1/2 a tablet in the pm      bimatoprost (LUMIGAN) 0.01 % SOLN ophthalmic drops Place 1 drop into both eyes nightly.  dorzolamide (TRUSOPT) 2 % ophthalmic solution Place 1 drop into both eyes 2 times daily.  warfarin (COUMADIN) 1 MG tablet TAKE TWO TABLETS BY MOUTH DAILY 180 tablet 0    losartan (COZAAR) 25 MG tablet Take 25 mg by mouth daily      warfarin (COUMADIN) 1 MG tablet Take 1 mg by mouth Daily with supper Dosing per Dr Omar Palencia      Omeprazole 20 MG TBEC Take 20 mg by mouth daily. 30 tablet 3     No current facility-administered medications for this visit. Allergies   Allergen Reactions    Adhesive Tape Hives    Asa [Aspirin]     Sulfa Antibiotics        Health Maintenance   Topic Date Due    DTaP/Tdap/Td vaccine (1 - Tdap) 09/07/2018 (Originally 8/19/1938)    Shingles Vaccine (1 of 2 - 2 Dose Series) 09/07/2018 (Originally [birth year]/19/1969)    Potassium monitoring  01/16/2019    Creatinine monitoring  01/16/2019    Flu vaccine  Completed    Pneumococcal low/med risk  Completed       Controlled Substances Monitoring:      HPI:     Leg Pain    The incident occurred more than 1 week ago. There was no injury mechanism. The pain is present in the left leg and right leg. The quality of the pain is described as aching (cyanotic and cool).  The pain is

## 2018-03-16 ENCOUNTER — HOSPITAL ENCOUNTER (OUTPATIENT)
Age: 83
Setting detail: SPECIMEN
Discharge: HOME | End: 2018-03-16
Payer: MEDICARE

## 2018-03-19 LAB
-: NORMAL
AMORPHOUS: NORMAL
BACTERIA: NORMAL
BILIRUBIN URINE: NEGATIVE
CASTS UA: NORMAL /LPF (ref 0–8)
COLOR: YELLOW
COMMENT UA: ABNORMAL
CRYSTALS, UA: NORMAL /HPF
CULTURE: NORMAL
CULTURE: NORMAL
EPITHELIAL CELLS UA: NORMAL /HPF (ref 0–5)
GLUCOSE URINE: NEGATIVE
KETONES, URINE: NEGATIVE
LEUKOCYTE ESTERASE, URINE: ABNORMAL
Lab: NORMAL
MUCUS: NORMAL
NITRITE, URINE: NEGATIVE
OTHER OBSERVATIONS UA: NORMAL
PH UA: 6 (ref 5–8)
PROTEIN UA: NEGATIVE
RBC UA: NORMAL /HPF (ref 0–4)
RENAL EPITHELIAL, UA: NORMAL /HPF
SPECIFIC GRAVITY UA: 1.02 (ref 1–1.03)
SPECIMEN DESCRIPTION: NORMAL
STATUS: NORMAL
TRICHOMONAS: NORMAL
TURBIDITY: CLEAR
URINE HGB: ABNORMAL
UROBILINOGEN, URINE: NORMAL
WBC UA: NORMAL /HPF (ref 0–5)
YEAST: NORMAL

## 2018-03-21 ENCOUNTER — TELEPHONE (OUTPATIENT)
Dept: INTERNAL MEDICINE | Age: 83
End: 2018-03-21

## 2018-03-21 RX ORDER — CIPROFLOXACIN 250 MG/1
250 TABLET, FILM COATED ORAL 2 TIMES DAILY
Qty: 20 TABLET | Refills: 0 | Status: SHIPPED | OUTPATIENT
Start: 2018-03-21 | End: 2018-03-31

## 2018-05-15 RX ORDER — DIGOXIN 125 MCG
TABLET ORAL
Qty: 30 TABLET | Refills: 3 | Status: SHIPPED | OUTPATIENT
Start: 2018-05-15 | End: 2018-11-21 | Stop reason: SDUPTHER

## 2018-06-06 ENCOUNTER — OFFICE VISIT (OUTPATIENT)
Dept: INTERNAL MEDICINE | Age: 83
End: 2018-06-06
Payer: MEDICARE

## 2018-06-06 VITALS
DIASTOLIC BLOOD PRESSURE: 100 MMHG | HEART RATE: 66 BPM | SYSTOLIC BLOOD PRESSURE: 162 MMHG | WEIGHT: 120 LBS | OXYGEN SATURATION: 96 % | BODY MASS INDEX: 19.37 KG/M2

## 2018-06-06 DIAGNOSIS — I10 ESSENTIAL HYPERTENSION: ICD-10-CM
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
## 2018-06-06 DIAGNOSIS — I50.42 CHRONIC COMBINED SYSTOLIC AND DIASTOLIC CONGESTIVE HEART FAILURE (HCC): Primary | ICD-10-CM
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
## 2018-06-06 PROCEDURE — 1090F PRES/ABSN URINE INCON ASSESS: CPT | Performed by: INTERNAL MEDICINE

## 2018-06-06 PROCEDURE — G8420 CALC BMI NORM PARAMETERS: HCPCS | Performed by: INTERNAL MEDICINE

## 2018-06-06 PROCEDURE — G8510 SCR DEP NEG, NO PLAN REQD: HCPCS | Performed by: INTERNAL MEDICINE

## 2018-06-06 PROCEDURE — 99214 OFFICE O/P EST MOD 30 MIN: CPT | Performed by: INTERNAL MEDICINE

## 2018-06-06 PROCEDURE — 3288F FALL RISK ASSESSMENT DOCD: CPT | Performed by: INTERNAL MEDICINE

## 2018-06-06 PROCEDURE — G8427 DOCREV CUR MEDS BY ELIG CLIN: HCPCS | Performed by: INTERNAL MEDICINE

## 2018-06-06 PROCEDURE — 1123F ACP DISCUSS/DSCN MKR DOCD: CPT | Performed by: INTERNAL MEDICINE

## 2018-06-06 PROCEDURE — 4040F PNEUMOC VAC/ADMIN/RCVD: CPT | Performed by: INTERNAL MEDICINE

## 2018-06-06 PROCEDURE — 1036F TOBACCO NON-USER: CPT | Performed by: INTERNAL MEDICINE

## 2018-06-06 RX ORDER — LATANOPROST 50 UG/ML
1 SOLUTION/ DROPS OPHTHALMIC NIGHTLY
COMMUNITY

## 2018-06-06 ASSESSMENT — ENCOUNTER SYMPTOMS
GASTROINTESTINAL NEGATIVE: 1
ALLERGIC/IMMUNOLOGIC NEGATIVE: 1
BACK PAIN: 1
RESPIRATORY NEGATIVE: 1
EYES NEGATIVE: 1

## 2018-06-06 ASSESSMENT — PATIENT HEALTH QUESTIONNAIRE - PHQ9
2. FEELING DOWN, DEPRESSED OR HOPELESS: 0
1. LITTLE INTEREST OR PLEASURE IN DOING THINGS: 0
SUM OF ALL RESPONSES TO PHQ QUESTIONS 1-9: 0
SUM OF ALL RESPONSES TO PHQ9 QUESTIONS 1 & 2: 0

## 2018-06-12 RX ORDER — FUROSEMIDE 20 MG/1
TABLET ORAL
Qty: 180 TABLET | Refills: 1 | Status: SHIPPED | OUTPATIENT
Start: 2018-06-12 | End: 2018-12-24 | Stop reason: SDUPTHER
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
## 2018-08-01 ENCOUNTER — OFFICE VISIT (OUTPATIENT)
Dept: INTERNAL MEDICINE | Age: 83
End: 2018-08-01
Payer: MEDICARE

## 2018-08-01 VITALS
BODY MASS INDEX: 18.45 KG/M2 | RESPIRATION RATE: 12 BRPM | WEIGHT: 114.8 LBS | SYSTOLIC BLOOD PRESSURE: 135 MMHG | DIASTOLIC BLOOD PRESSURE: 85 MMHG | HEIGHT: 66 IN | OXYGEN SATURATION: 98 % | TEMPERATURE: 97.3 F | HEART RATE: 78 BPM

## 2018-08-01 DIAGNOSIS — R19.7 DIARRHEA OF PRESUMED INFECTIOUS ORIGIN: Primary | ICD-10-CM

## 2018-08-01 PROCEDURE — 4040F PNEUMOC VAC/ADMIN/RCVD: CPT | Performed by: INTERNAL MEDICINE

## 2018-08-01 PROCEDURE — 1090F PRES/ABSN URINE INCON ASSESS: CPT | Performed by: INTERNAL MEDICINE

## 2018-08-01 PROCEDURE — 1036F TOBACCO NON-USER: CPT | Performed by: INTERNAL MEDICINE

## 2018-08-01 PROCEDURE — 1101F PT FALLS ASSESS-DOCD LE1/YR: CPT | Performed by: INTERNAL MEDICINE

## 2018-08-01 PROCEDURE — 99214 OFFICE O/P EST MOD 30 MIN: CPT | Performed by: INTERNAL MEDICINE

## 2018-08-01 PROCEDURE — G8427 DOCREV CUR MEDS BY ELIG CLIN: HCPCS | Performed by: INTERNAL MEDICINE

## 2018-08-01 PROCEDURE — 1123F ACP DISCUSS/DSCN MKR DOCD: CPT | Performed by: INTERNAL MEDICINE

## 2018-08-01 PROCEDURE — G8420 CALC BMI NORM PARAMETERS: HCPCS | Performed by: INTERNAL MEDICINE

## 2018-08-01 ASSESSMENT — ENCOUNTER SYMPTOMS
DIARRHEA: 1
EYES NEGATIVE: 1
ALLERGIC/IMMUNOLOGIC NEGATIVE: 1
RESPIRATORY NEGATIVE: 1

## 2018-08-01 NOTE — PROGRESS NOTES
[facility] [address] INTERNAL MEDICINE [facility] [address]  [address] [facility] [place] [address]  Dept: [phone]  Dept Fax: [phone]    Natalie Siegel is a 80 y.o. female who presents today for   Chief Complaint   Patient presents with    Diarrhea    Emesis    and follow up of chronic medical problems:   Patient Active Problem List   Diagnosis    Need for prophylactic vaccination and inoculation against influenza    Essential hypertension    Chronic atrial fibrillation ([address]    Chronic combined systolic and diastolic congestive heart failure ([address]    Gastroesophageal reflux disease without esophagitis    Keratinous cyst    PVD (peripheral vascular disease) ([address]    Acute cystitis without hematuria    Claudication, intermittent ([facility] [address] [facility]   . Past Medical History:   Diagnosis Date    AF (atrial fibrillation) (HCC)     Glaucoma     Hypertension        Past Surgical History:   Procedure Laterality Date    LUMBAR SPINE SURGERY         No family history on file. Social History   Substance Use Topics    Smoking status: Never Smoker    Smokeless tobacco: Never Used    Alcohol use Yes      Current Outpatient Prescriptions   Medication Sig Dispense Refill    furosemide (LASIX) 20 MG tablet TAKE TWO TABLETS BY MOUTH DAILY 180 tablet 1    latanoprost (XALATAN) 0.005 % ophthalmic solution 1 drop nightly      digoxin (LANOXIN) 125 MCG tablet TAKE ONE TABLET BY MOUTH DAILY ON MONDAY, TUESDAY,THURSDAY,FRIDAY AND SATURDAY 30 tablet 3    sertraline (ZOLOFT) 25 MG tablet TAKE TWO TABLETS BY MOUTH DAILY 180 tablet 3    apixaban (ELIQUIS) 5 MG TABS tablet Take 2.5 mg by mouth 2 times daily       HYDROcodone-acetaminophen (NORCO) 5-325 MG per tablet Take 1 tablet by mouth every 6 hours as needed for Pain .  120 tablet 0    Blood Pressure KIT Check weekly 1 kit 0    metoprolol tartrate (LOPRESSOR) 25 MG tablet Take 12.5 mg by mouth 2 times daily Take 1/2 a tab in the am and 1/2 a tablet in rate and regular rhythm. Pulmonary/Chest: Effort normal and breath sounds normal.   Abdominal: Soft. Musculoskeletal: She exhibits no edema. Neurological: She is alert and oriented to person, place, and time. Skin: Skin is warm and dry. Psychiatric: She has a normal mood and affect. Her behavior is normal.   Vitals reviewed.

## 2018-10-24 NOTE — TELEPHONE ENCOUNTER
Next Visit Date:  Future Appointments  Date Time Provider Luc Shannan   11/14/2018 1:00 PM Santiago Martinez  [facility] Maintenance   Topic Date Due    DTaP/Tdap/Td vaccine (1 - Tdap) [birth year]19/1938    Shingles Vaccine (1 of 2 - 2 Dose Series) 08/19/1969    Flu vaccine (1) 09/01/2018    Potassium monitoring  01/16/2019    Creatinine monitoring  01/16/2019    Pneumococcal low/med risk  Completed       Hemoglobin A1C (%)   Date Value   10/25/2011 5.7             ( goal A1C is < 7)   No results found for: LABMICR  LDL Cholesterol (mg/dL)   Date Value   07/23/2013 85   10/25/2011 105 (H)       (goal LDL is <100)   AST (U/L)   Date Value   05/04/2016 26     ALT (U/L)   Date Value   05/04/2016 15     BUN (mg/dL)   Date Value   01/16/2018 31 (H)     BP Readings from Last 3 Encounters:   08/01/18 135/85   06/06/18 (!) 162/100   03/07/18 (!) 169/88          (goal 120/80)    All Future Testing planned in [facility]  Lab Frequency Next Occurrence   VL Upper Extremity Arterial Segmental Pressures With Ppg Once 12/23/2018               Patient Active Problem List:     Need for prophylactic vaccination and inoculation against influenza     Essential hypertension     Chronic atrial fibrillation (HCC)     Chronic combined systolic and diastolic congestive heart failure (HCC)     Gastroesophageal reflux disease without esophagitis     Keratinous cyst     PVD (peripheral vascular disease) (HCC)     Acute cystitis without hematuria     Claudication, intermittent ([address]

## 2018-11-05 ENCOUNTER — OFFICE VISIT (OUTPATIENT)
Dept: INTERNAL MEDICINE | Age: 83
End: 2018-11-05
Payer: MEDICARE

## 2018-11-05 VITALS
DIASTOLIC BLOOD PRESSURE: 80 MMHG | WEIGHT: 119 LBS | SYSTOLIC BLOOD PRESSURE: 147 MMHG | BODY MASS INDEX: 19.21 KG/M2 | HEART RATE: 73 BPM

## 2018-11-05 DIAGNOSIS — I10 ESSENTIAL HYPERTENSION: Primary | ICD-10-CM

## 2018-11-05 DIAGNOSIS — Z23 FLU VACCINE NEED: ICD-10-CM

## 2018-11-05 DIAGNOSIS — I50.42 CHRONIC COMBINED SYSTOLIC AND DIASTOLIC CONGESTIVE HEART FAILURE (HCC): ICD-10-CM

## 2018-11-05 PROCEDURE — G8427 DOCREV CUR MEDS BY ELIG CLIN: HCPCS | Performed by: INTERNAL MEDICINE

## 2018-11-05 PROCEDURE — 99214 OFFICE O/P EST MOD 30 MIN: CPT | Performed by: INTERNAL MEDICINE

## 2018-11-05 PROCEDURE — G8482 FLU IMMUNIZE ORDER/ADMIN: HCPCS | Performed by: INTERNAL MEDICINE

## 2018-11-05 PROCEDURE — 90688 IIV4 VACCINE SPLT 0.5 ML IM: CPT | Performed by: INTERNAL MEDICINE

## 2018-11-05 PROCEDURE — G0008 ADMIN INFLUENZA VIRUS VAC: HCPCS | Performed by: INTERNAL MEDICINE

## 2018-11-05 PROCEDURE — 4040F PNEUMOC VAC/ADMIN/RCVD: CPT | Performed by: INTERNAL MEDICINE

## 2018-11-05 PROCEDURE — G8420 CALC BMI NORM PARAMETERS: HCPCS | Performed by: INTERNAL MEDICINE

## 2018-11-05 PROCEDURE — 1036F TOBACCO NON-USER: CPT | Performed by: INTERNAL MEDICINE

## 2018-11-05 PROCEDURE — 1101F PT FALLS ASSESS-DOCD LE1/YR: CPT | Performed by: INTERNAL MEDICINE

## 2018-11-05 PROCEDURE — 1090F PRES/ABSN URINE INCON ASSESS: CPT | Performed by: INTERNAL MEDICINE

## 2018-11-05 PROCEDURE — 1123F ACP DISCUSS/DSCN MKR DOCD: CPT | Performed by: INTERNAL MEDICINE

## 2018-11-05 ASSESSMENT — ENCOUNTER SYMPTOMS
SHORTNESS OF BREATH: 1
ALLERGIC/IMMUNOLOGIC NEGATIVE: 1
GASTROINTESTINAL NEGATIVE: 1
EYES NEGATIVE: 1

## 2018-11-05 NOTE — PROGRESS NOTES
Skin: Negative. Allergic/Immunologic: Negative. Neurological: Positive for dizziness (Sometimes). Hematological: Negative. Psychiatric/Behavioral: Negative. Objective:     Physical Exam   Constitutional: She is oriented to person, place, and time. She appears well-developed. HENT:   Head: Normocephalic and atraumatic. Neck: Neck supple. Cardiovascular: Normal rate and regular rhythm. Pulmonary/Chest: Effort normal and breath sounds normal.   Abdominal: Soft. Musculoskeletal: She exhibits no edema. Neurological: She is alert and oriented to person, place, and time. Skin: Skin is warm and dry. Psychiatric: She has a normal mood and affect. Her behavior is normal.   Vitals reviewed. Visit Information    Have you changed or started any medications since your last visit including any over-the-counter medicines, vitamins, or herbal medicines? no   Are you having any side effects from any of your medications? -  no  Have you stopped taking any of your medications? Is so, why? -  no    Have you seen any other physician or provider since your last visit? No  Have you had any other diagnostic tests since your last visit? No  Have you been seen in the emergency room and/or had an admission to a hospital since we last saw you? No  Have you had your routine dental cleaning in the past 6 months? no    Have you activated your [employer] account? If not, what are your barriers?  Yes     Patient Care Team:  Jayme Riojas MD as PCP - Vinnie Dodson MD as PCP - [facility] Attributed Provider  [facility] Pharmacist  Cintia Leslie DPM as Consulting Physician (Podiatry)  [facility]  Lori Joe MD as Consulting Physician (Cardiology)    Medical History Review  Past Medical, Family, and Social History reviewed and does contribute to the patient presenting condition    Health Maintenance   Topic Date Due    DTaP/Tdap/Td vaccine (1 -

## 2018-12-21 ENCOUNTER — OFFICE VISIT (OUTPATIENT)
Dept: PRIMARY CARE CLINIC | Age: 83
End: 2018-12-21
Payer: MEDICARE

## 2018-12-21 ENCOUNTER — HOSPITAL ENCOUNTER (OUTPATIENT)
Dept: GENERAL RADIOLOGY | Age: 83
Discharge: HOME | End: 2018-12-23
Payer: MEDICARE

## 2018-12-21 ENCOUNTER — HOSPITAL ENCOUNTER (OUTPATIENT)
Age: 83
Discharge: HOME | End: 2018-12-23
Payer: MEDICARE

## 2018-12-21 ENCOUNTER — HOSPITAL ENCOUNTER (OUTPATIENT)
Age: 83
Discharge: HOME | End: 2018-12-21
Payer: MEDICARE

## 2018-12-21 VITALS
OXYGEN SATURATION: 97 % | BODY MASS INDEX: 20.66 KG/M2 | SYSTOLIC BLOOD PRESSURE: 166 MMHG | HEART RATE: 81 BPM | WEIGHT: 128 LBS | TEMPERATURE: 97.7 F | DIASTOLIC BLOOD PRESSURE: 97 MMHG

## 2018-12-21 DIAGNOSIS — R05.9 COUGH: Primary | ICD-10-CM

## 2018-12-21 DIAGNOSIS — M79.89 LEG SWELLING: ICD-10-CM

## 2018-12-21 DIAGNOSIS — Z91.81 AT HIGH RISK FOR FALLS: ICD-10-CM

## 2018-12-21 DIAGNOSIS — I50.9 CHRONIC CONGESTIVE HEART FAILURE, UNSPECIFIED HEART FAILURE TYPE (HCC): ICD-10-CM

## 2018-12-21 DIAGNOSIS — R05.9 COUGH: ICD-10-CM

## 2018-12-21 DIAGNOSIS — J02.9 SORE THROAT: ICD-10-CM

## 2018-12-21 LAB
ABSOLUTE EOS #: <0.03 K/UL (ref 0–0.44)
ABSOLUTE IMMATURE GRANULOCYTE: 0.04 K/UL (ref 0–0.3)
ABSOLUTE LYMPH #: 0.78 K/UL (ref 1.1–3.7)
ABSOLUTE MONO #: 0.99 K/UL (ref 0.1–1.2)
ANION GAP SERPL CALCULATED.3IONS-SCNC: 12 MMOL/L (ref 9–17)
BASOPHILS # BLD: 0 % (ref 0–2)
BASOPHILS ABSOLUTE: 0.03 K/UL (ref 0–0.2)
BNP INTERPRETATION: ABNORMAL
BUN BLDV-MCNC: 26 MG/DL (ref 8–23)
BUN/CREAT BLD: ABNORMAL (ref 9–20)
CALCIUM SERPL-MCNC: 9 MG/DL (ref 8.6–10.4)
CHLORIDE BLD-SCNC: 101 MMOL/L (ref 98–107)
CO2: 25 MMOL/L (ref 20–31)
CREAT SERPL-MCNC: 1.3 MG/DL (ref 0.5–0.9)
DIFFERENTIAL TYPE: ABNORMAL
EOSINOPHILS RELATIVE PERCENT: 0 % (ref 1–4)
GFR AFRICAN AMERICAN: 46 ML/MIN
GFR NON-AFRICAN AMERICAN: 38 ML/MIN
GFR SERPL CREATININE-BSD FRML MDRD: ABNORMAL ML/MIN/{1.73_M2}
GFR SERPL CREATININE-BSD FRML MDRD: ABNORMAL ML/MIN/{1.73_M2}
GLUCOSE BLD-MCNC: 110 MG/DL (ref 70–99)
HCT VFR BLD CALC: 41.7 % (ref 36.3–47.1)
HEMOGLOBIN: 12.8 G/DL (ref 11.9–15.1)
IMMATURE GRANULOCYTES: 0 %
LYMPHOCYTES # BLD: 8 % (ref 24–43)
MCH RBC QN AUTO: 30.9 PG (ref 25.2–33.5)
MCHC RBC AUTO-ENTMCNC: 30.7 G/DL (ref 28.4–34.8)
MCV RBC AUTO: 100.7 FL (ref 82.6–102.9)
MONOCYTES # BLD: 11 % (ref 3–12)
NRBC AUTOMATED: 0 PER 100 WBC
PDW BLD-RTO: 15 % (ref 11.8–14.4)
PLATELET # BLD: 197 K/UL (ref 138–453)
PLATELET ESTIMATE: ABNORMAL
PMV BLD AUTO: 10.2 FL (ref 8.1–13.5)
POTASSIUM SERPL-SCNC: 4.4 MMOL/L (ref 3.7–5.3)
PRO-BNP: 7146 PG/ML
RBC # BLD: 4.14 M/UL (ref 3.95–5.11)
RBC # BLD: ABNORMAL 10*6/UL
S PYO AG THROAT QL: NORMAL
SEG NEUTROPHILS: 81 % (ref 36–65)
SEGMENTED NEUTROPHILS ABSOLUTE COUNT: 7.47 K/UL (ref 1.5–8.1)
SODIUM BLD-SCNC: 138 MMOL/L (ref 135–144)
WBC # BLD: 9.3 K/UL (ref 3.5–11.3)
WBC # BLD: ABNORMAL 10*3/UL

## 2018-12-21 PROCEDURE — 87880 STREP A ASSAY W/OPTIC: CPT | Performed by: NURSE PRACTITIONER

## 2018-12-21 PROCEDURE — 1036F TOBACCO NON-USER: CPT | Performed by: NURSE PRACTITIONER

## 2018-12-21 PROCEDURE — 85025 COMPLETE CBC W/AUTO DIFF WBC: CPT

## 2018-12-21 PROCEDURE — 36415 COLL VENOUS BLD VENIPUNCTURE: CPT

## 2018-12-21 PROCEDURE — 4040F PNEUMOC VAC/ADMIN/RCVD: CPT | Performed by: NURSE PRACTITIONER

## 2018-12-21 PROCEDURE — 1090F PRES/ABSN URINE INCON ASSESS: CPT | Performed by: NURSE PRACTITIONER

## 2018-12-21 PROCEDURE — 83880 ASSAY OF NATRIURETIC PEPTIDE: CPT

## 2018-12-21 PROCEDURE — G8482 FLU IMMUNIZE ORDER/ADMIN: HCPCS | Performed by: NURSE PRACTITIONER

## 2018-12-21 PROCEDURE — 80048 BASIC METABOLIC PNL TOTAL CA: CPT

## 2018-12-21 PROCEDURE — 1123F ACP DISCUSS/DSCN MKR DOCD: CPT | Performed by: NURSE PRACTITIONER

## 2018-12-21 PROCEDURE — 71046 X-RAY EXAM CHEST 2 VIEWS: CPT

## 2018-12-21 PROCEDURE — G8420 CALC BMI NORM PARAMETERS: HCPCS | Performed by: NURSE PRACTITIONER

## 2018-12-21 PROCEDURE — G8427 DOCREV CUR MEDS BY ELIG CLIN: HCPCS | Performed by: NURSE PRACTITIONER

## 2018-12-21 PROCEDURE — 99202 OFFICE O/P NEW SF 15 MIN: CPT | Performed by: NURSE PRACTITIONER

## 2018-12-21 PROCEDURE — 1101F PT FALLS ASSESS-DOCD LE1/YR: CPT | Performed by: NURSE PRACTITIONER

## 2018-12-21 RX ORDER — AMOXICILLIN 875 MG/1
875 TABLET, COATED ORAL 2 TIMES DAILY
Qty: 20 TABLET | Refills: 0 | Status: SHIPPED | OUTPATIENT
Start: 2018-12-21 | End: 2018-12-31

## 2018-12-21 ASSESSMENT — ENCOUNTER SYMPTOMS
ABDOMINAL PAIN: 0
SORE THROAT: 1
SHORTNESS OF BREATH: 0
COUGH: 1
NAUSEA: 0
VOMITING: 0
SINUS PAIN: 0
DIARRHEA: 0

## 2018-12-21 NOTE — PROGRESS NOTES
Visit Information    Have you changed or started any medications since your last visit including any over-the-counter medicines, vitamins, or herbal medicines? no   Are you having any side effects from any of your medications? -  no  Have you stopped taking any of your medications? Is so, why? -  no    Have you seen any other physician or provider since your last visit? Yes - Records Requested  Have you had any other diagnostic tests since your last visit? No  Have you been seen in the emergency room and/or had an admission to a hospital since we last saw you? No  Have you had your routine dental cleaning in the past 6 months? no    Have you activated your [employer] account? If not, what are your barriers?  Yes     Patient Care Team:  Earl Medrano MD as PCP - Jovani Ivy MD as PCP - S Attributed Provider  [facility] as Pharmacist  Angel Sutherland DPM as Consulting Physician (Podiatry)  THE [facility]  Debra Gifford MD as Consulting Physician (Cardiology)    Medical History Review  Past Medical, Family, and Social History reviewed and does not contribute to the patient presenting condition    Health Maintenance   Topic Date Due    DTaP/Tdap/Td vaccine (1 - Tdap) 08/19/1938    Shingles Vaccine (1 of 2 - 2 Dose Series) 08/19/1969    Potassium monitoring  01/16/2019    Creatinine monitoring  01/16/2019    Flu vaccine  Completed    Pneumococcal low/med risk  Completed

## 2018-12-21 NOTE — PROGRESS NOTES
[facility]  [address]  [address] [facility] [address]  Dept: [phone]  Dept Fax: [phone]    Princess Mejía is a 80 y.o. female who presents to the urgent care today for her medicalconditions/complaints as noted below. Princess Mejía is c/o of Cough (onset about 3 days ago. ); Pharyngitis (onset about 3 days ago. ); Congestion; and Chills      HPI:       80year old female presents with c/o cough, sore throat. Describes cough is harsh, productive of thick green mucus. Describes sore throat is scratchy worst when swallowing, rated 5/10. Pt also has had increased ankle/ foot swelling. Denies shortness of breath, chest pain. Denies fevers, chills. Positive for fatigue. Treatments tried include none. Worsening factors include none. Alleviating factors include none. Past Medical History:   Diagnosis Date    AF (atrial fibrillation) (HCC)     Glaucoma     Hypertension         Current Outpatient Prescriptions   Medication Sig Dispense Refill    amoxicillin (AMOXIL) 875 MG tablet Take 1 tablet by mouth 2 times daily for 10 days 20 tablet 0    DIGOX 125 MCG tablet TAKE ONE TABLET BY MOUTH DAILY ON MON,TUES,THURS,FRI,AND SAT 30 tablet 5    furosemide (LASIX) 20 MG tablet TAKE TWO TABLETS BY MOUTH DAILY 180 tablet 1    latanoprost (XALATAN) 0.005 % ophthalmic solution 1 drop nightly      sertraline (ZOLOFT) 25 MG tablet TAKE TWO TABLETS BY MOUTH DAILY 180 tablet 3    apixaban (ELIQUIS) 5 MG TABS tablet Take 2.5 mg by mouth 2 times daily       HYDROcodone-acetaminophen (NORCO) 5-325 MG per tablet Take 1 tablet by mouth every 6 hours as needed for Pain .  120 tablet 0    Blood Pressure KIT Check weekly 1 kit 0    metoprolol tartrate (LOPRESSOR) 25 MG tablet Take 12.5 mg by mouth 2 times daily Take 1/2 a tab in the am and 1/2 a tablet in the pm      bimatoprost (LUMIGAN) 0.01 % SOLN ophthalmic drops Place 1 drop into both eyes

## 2018-12-24 ENCOUNTER — OFFICE VISIT (OUTPATIENT)
Dept: PRIMARY CARE CLINIC | Age: 83
End: 2018-12-24
Payer: MEDICARE

## 2018-12-24 VITALS — SYSTOLIC BLOOD PRESSURE: 128 MMHG | HEART RATE: 116 BPM | DIASTOLIC BLOOD PRESSURE: 78 MMHG | OXYGEN SATURATION: 97 %

## 2018-12-24 DIAGNOSIS — N18.30 CHRONIC KIDNEY DISEASE, STAGE III (MODERATE) (HCC): ICD-10-CM

## 2018-12-24 DIAGNOSIS — Z74.01 BEDRIDDEN: ICD-10-CM

## 2018-12-24 DIAGNOSIS — I50.43 ACUTE ON CHRONIC COMBINED SYSTOLIC AND DIASTOLIC CONGESTIVE HEART FAILURE (HCC): Primary | ICD-10-CM

## 2018-12-24 PROBLEM — N18.4 CHRONIC RENAL IMPAIRMENT, STAGE 4 (SEVERE) (HCC): Status: ACTIVE | Noted: 2018-12-24

## 2018-12-24 PROCEDURE — G8420 CALC BMI NORM PARAMETERS: HCPCS | Performed by: INTERNAL MEDICINE

## 2018-12-24 PROCEDURE — 1036F TOBACCO NON-USER: CPT | Performed by: INTERNAL MEDICINE

## 2018-12-24 PROCEDURE — 99214 OFFICE O/P EST MOD 30 MIN: CPT | Performed by: INTERNAL MEDICINE

## 2018-12-24 PROCEDURE — 1101F PT FALLS ASSESS-DOCD LE1/YR: CPT | Performed by: INTERNAL MEDICINE

## 2018-12-24 PROCEDURE — 1123F ACP DISCUSS/DSCN MKR DOCD: CPT | Performed by: INTERNAL MEDICINE

## 2018-12-24 PROCEDURE — 4040F PNEUMOC VAC/ADMIN/RCVD: CPT | Performed by: INTERNAL MEDICINE

## 2018-12-24 PROCEDURE — 1090F PRES/ABSN URINE INCON ASSESS: CPT | Performed by: INTERNAL MEDICINE

## 2018-12-24 PROCEDURE — G8427 DOCREV CUR MEDS BY ELIG CLIN: HCPCS | Performed by: INTERNAL MEDICINE

## 2018-12-24 PROCEDURE — G8482 FLU IMMUNIZE ORDER/ADMIN: HCPCS | Performed by: INTERNAL MEDICINE

## 2018-12-24 RX ORDER — FUROSEMIDE 20 MG/1
40 TABLET ORAL 2 TIMES DAILY
Qty: 360 TABLET | Refills: 1 | Status: SHIPPED | OUTPATIENT
Start: 2018-12-24 | End: 2019-04-15

## 2018-12-24 ASSESSMENT — ENCOUNTER SYMPTOMS
COUGH: 1
SHORTNESS OF BREATH: 1

## 2019-04-08 ENCOUNTER — HOSPITAL ENCOUNTER (OUTPATIENT)
Age: 84
Setting detail: SPECIMEN
Discharge: HOME | End: 2019-04-08
Payer: MEDICARE

## 2019-04-08 LAB
ABSOLUTE EOS #: 0.1 K/UL (ref 0–0.44)
ABSOLUTE IMMATURE GRANULOCYTE: <0.03 K/UL (ref 0–0.3)
ABSOLUTE LYMPH #: 1.02 K/UL (ref 1.1–3.7)
ABSOLUTE MONO #: 0.72 K/UL (ref 0.1–1.2)
ALBUMIN SERPL-MCNC: 3.8 G/DL (ref 3.5–5.2)
ALBUMIN/GLOBULIN RATIO: 0.9 (ref 1–2.5)
ALP BLD-CCNC: 109 U/L (ref 35–104)
ALT SERPL-CCNC: 14 U/L (ref 5–33)
ANION GAP SERPL CALCULATED.3IONS-SCNC: 13 MMOL/L (ref 9–17)
AST SERPL-CCNC: 25 U/L
BASOPHILS # BLD: 1 % (ref 0–2)
BASOPHILS ABSOLUTE: 0.04 K/UL (ref 0–0.2)
BILIRUB SERPL-MCNC: 0.36 MG/DL (ref 0.3–1.2)
BNP INTERPRETATION: ABNORMAL
BUN BLDV-MCNC: 40 MG/DL (ref 8–23)
BUN/CREAT BLD: ABNORMAL (ref 9–20)
CALCIUM SERPL-MCNC: 9.2 MG/DL (ref 8.6–10.4)
CHLORIDE BLD-SCNC: 99 MMOL/L (ref 98–107)
CO2: 27 MMOL/L (ref 20–31)
CREAT SERPL-MCNC: 1.2 MG/DL (ref 0.5–0.9)
DIFFERENTIAL TYPE: ABNORMAL
EOSINOPHILS RELATIVE PERCENT: 2 % (ref 1–4)
GFR AFRICAN AMERICAN: 50 ML/MIN
GFR NON-AFRICAN AMERICAN: 41 ML/MIN
GFR SERPL CREATININE-BSD FRML MDRD: ABNORMAL ML/MIN/{1.73_M2}
GFR SERPL CREATININE-BSD FRML MDRD: ABNORMAL ML/MIN/{1.73_M2}
GLUCOSE BLD-MCNC: 93 MG/DL (ref 70–99)
HCT VFR BLD CALC: 43 % (ref 36.3–47.1)
HEMOGLOBIN: 13.1 G/DL (ref 11.9–15.1)
IMMATURE GRANULOCYTES: 0 %
LYMPHOCYTES # BLD: 18 % (ref 24–43)
MCH RBC QN AUTO: 30.8 PG (ref 25.2–33.5)
MCHC RBC AUTO-ENTMCNC: 30.5 G/DL (ref 28.4–34.8)
MCV RBC AUTO: 101.2 FL (ref 82.6–102.9)
MONOCYTES # BLD: 13 % (ref 3–12)
NRBC AUTOMATED: 0 PER 100 WBC
PDW BLD-RTO: 15.2 % (ref 11.8–14.4)
PLATELET # BLD: 254 K/UL (ref 138–453)
PLATELET ESTIMATE: ABNORMAL
PMV BLD AUTO: 10.8 FL (ref 8.1–13.5)
POTASSIUM SERPL-SCNC: 3.6 MMOL/L (ref 3.7–5.3)
PRO-BNP: 5577 PG/ML
RBC # BLD: 4.25 M/UL (ref 3.95–5.11)
RBC # BLD: ABNORMAL 10*6/UL
SEG NEUTROPHILS: 66 % (ref 36–65)
SEGMENTED NEUTROPHILS ABSOLUTE COUNT: 3.76 K/UL (ref 1.5–8.1)
SODIUM BLD-SCNC: 139 MMOL/L (ref 135–144)
TOTAL PROTEIN: 8.2 G/DL (ref 6.4–8.3)
WBC # BLD: 5.7 K/UL (ref 3.5–11.3)
WBC # BLD: ABNORMAL 10*3/UL

## 2019-04-15 ENCOUNTER — APPOINTMENT (OUTPATIENT)
Dept: GENERAL RADIOLOGY | Age: 84
DRG: 603 | End: 2019-04-15
Payer: MEDICARE

## 2019-04-15 ENCOUNTER — APPOINTMENT (OUTPATIENT)
Dept: CT IMAGING | Age: 84
DRG: 603 | End: 2019-04-15
Payer: MEDICARE

## 2019-04-15 ENCOUNTER — HOSPITAL ENCOUNTER (INPATIENT)
Age: 84
LOS: 3 days | Discharge: NURSING, REHAB OR LONG-TERM CARE | DRG: 603 | End: 2019-04-18
Attending: EMERGENCY MEDICINE | Admitting: FAMILY MEDICINE
Payer: MEDICARE

## 2019-04-15 DIAGNOSIS — R60.9 PERIPHERAL EDEMA: Primary | ICD-10-CM

## 2019-04-15 DIAGNOSIS — R41.0 CONFUSION: ICD-10-CM

## 2019-04-15 DIAGNOSIS — L03.115 CELLULITIS OF RIGHT LOWER EXTREMITY: ICD-10-CM

## 2019-04-15 LAB
% CKMB: 3.7 % (ref 0–3)
-: ABNORMAL
ABSOLUTE EOS #: 0.2 K/UL (ref 0–0.4)
ABSOLUTE IMMATURE GRANULOCYTE: ABNORMAL K/UL (ref 0–0.3)
ABSOLUTE LYMPH #: 1 K/UL (ref 1–4.8)
ABSOLUTE MONO #: 0.7 K/UL (ref 0.2–0.8)
ALBUMIN SERPL-MCNC: 3.5 G/DL (ref 3.5–5.2)
ALBUMIN/GLOBULIN RATIO: NORMAL (ref 1–2.5)
ALP BLD-CCNC: 98 U/L (ref 35–104)
ALT SERPL-CCNC: 13 U/L (ref 5–33)
AMORPHOUS: ABNORMAL
ANION GAP SERPL CALCULATED.3IONS-SCNC: 11 MMOL/L (ref 9–17)
AST SERPL-CCNC: 24 U/L
BACTERIA: ABNORMAL
BASOPHILS # BLD: 0 % (ref 0–2)
BASOPHILS ABSOLUTE: 0 K/UL (ref 0–0.2)
BILIRUB SERPL-MCNC: 0.55 MG/DL (ref 0.3–1.2)
BILIRUBIN DIRECT: 0.13 MG/DL
BILIRUBIN URINE: NEGATIVE
BILIRUBIN, INDIRECT: 0.42 MG/DL (ref 0–1)
BNP INTERPRETATION: ABNORMAL
BUN BLDV-MCNC: 36 MG/DL (ref 8–23)
BUN/CREAT BLD: 27 (ref 9–20)
CALCIUM SERPL-MCNC: 8.9 MG/DL (ref 8.6–10.4)
CASTS UA: ABNORMAL /LPF
CHLORIDE BLD-SCNC: 99 MMOL/L (ref 98–107)
CK MB: 2.2 NG/ML
CKMB INTERPRETATION: ABNORMAL
CO2: 29 MMOL/L (ref 20–31)
COLOR: YELLOW
COMMENT UA: ABNORMAL
CREAT SERPL-MCNC: 1.31 MG/DL (ref 0.5–0.9)
CRYSTALS, UA: ABNORMAL /HPF
DIFFERENTIAL TYPE: ABNORMAL
DIGOXIN DATE LAST DOSE: NORMAL
DIGOXIN DOSE AMOUNT: NORMAL
DIGOXIN DOSE TIME: NORMAL
DIGOXIN LEVEL: 0.7 NG/ML (ref 0.5–2)
EKG ATRIAL RATE: 66 BPM
EKG Q-T INTERVAL: 380 MS
EKG QRS DURATION: 94 MS
EKG QTC CALCULATION (BAZETT): 441 MS
EKG R AXIS: -41 DEGREES
EKG T AXIS: 56 DEGREES
EKG VENTRICULAR RATE: 81 BPM
EOSINOPHILS RELATIVE PERCENT: 3 % (ref 1–4)
EPITHELIAL CELLS UA: ABNORMAL /HPF (ref 0–5)
GFR AFRICAN AMERICAN: 45 ML/MIN
GFR NON-AFRICAN AMERICAN: 37 ML/MIN
GFR SERPL CREATININE-BSD FRML MDRD: ABNORMAL ML/MIN/{1.73_M2}
GFR SERPL CREATININE-BSD FRML MDRD: ABNORMAL ML/MIN/{1.73_M2}
GLOBULIN: NORMAL G/DL (ref 1.5–3.8)
GLUCOSE BLD-MCNC: 100 MG/DL (ref 65–105)
GLUCOSE BLD-MCNC: 87 MG/DL (ref 70–99)
GLUCOSE URINE: NEGATIVE
HCT VFR BLD CALC: 38.3 % (ref 36–46)
HEMOGLOBIN: 13 G/DL (ref 12–16)
IMMATURE GRANULOCYTES: ABNORMAL %
INR BLD: 1.1
KETONES, URINE: NEGATIVE
LEUKOCYTE ESTERASE, URINE: NEGATIVE
LYMPHOCYTES # BLD: 17 % (ref 24–44)
MAGNESIUM: 2.4 MG/DL (ref 1.6–2.6)
MCH RBC QN AUTO: 30.9 PG (ref 26–34)
MCHC RBC AUTO-ENTMCNC: 34 G/DL (ref 31–37)
MCV RBC AUTO: 91 FL (ref 80–100)
MONOCYTES # BLD: 11 % (ref 1–7)
MUCUS: ABNORMAL
MYOGLOBIN: 97 NG/ML (ref 25–58)
NITRITE, URINE: NEGATIVE
NRBC AUTOMATED: ABNORMAL PER 100 WBC
OTHER OBSERVATIONS UA: ABNORMAL
PARTIAL THROMBOPLASTIN TIME: 28.7 SEC (ref 23–31)
PDW BLD-RTO: 16.1 % (ref 11.5–14.5)
PH UA: 6 (ref 5–8)
PLATELET # BLD: 245 K/UL (ref 130–400)
PLATELET ESTIMATE: ABNORMAL
PMV BLD AUTO: 8.2 FL (ref 6–12)
POTASSIUM SERPL-SCNC: 3.4 MMOL/L (ref 3.7–5.3)
PRO-BNP: 6404 PG/ML
PROTEIN UA: NEGATIVE
PROTHROMBIN TIME: 11.1 SEC (ref 9.7–11.6)
RBC # BLD: 4.21 M/UL (ref 4–5.2)
RBC # BLD: ABNORMAL 10*6/UL
RBC UA: ABNORMAL /HPF (ref 0–2)
RENAL EPITHELIAL, UA: ABNORMAL /HPF
SEG NEUTROPHILS: 69 % (ref 36–66)
SEGMENTED NEUTROPHILS ABSOLUTE COUNT: 4.2 K/UL (ref 1.8–7.7)
SODIUM BLD-SCNC: 139 MMOL/L (ref 135–144)
SPECIFIC GRAVITY UA: 1.01 (ref 1–1.03)
TOTAL CK: 59 U/L (ref 26–192)
TOTAL PROTEIN: 8 G/DL (ref 6.4–8.3)
TRICHOMONAS: ABNORMAL
TROPONIN INTERP: ABNORMAL
TROPONIN T: ABNORMAL NG/ML
TROPONIN, HIGH SENSITIVITY: 51 NG/L (ref 0–14)
TSH SERPL DL<=0.05 MIU/L-ACNC: 1.46 MIU/L (ref 0.3–5)
TURBIDITY: ABNORMAL
URINE HGB: NEGATIVE
UROBILINOGEN, URINE: NORMAL
WBC # BLD: 6.2 K/UL (ref 3.5–11)
WBC # BLD: ABNORMAL 10*3/UL
WBC UA: ABNORMAL /HPF (ref 0–5)
YEAST: ABNORMAL

## 2019-04-15 PROCEDURE — 85730 THROMBOPLASTIN TIME PARTIAL: CPT

## 2019-04-15 PROCEDURE — 6360000002 HC RX W HCPCS: Performed by: FAMILY MEDICINE

## 2019-04-15 PROCEDURE — 82947 ASSAY GLUCOSE BLOOD QUANT: CPT

## 2019-04-15 PROCEDURE — 82550 ASSAY OF CK (CPK): CPT

## 2019-04-15 PROCEDURE — 80048 BASIC METABOLIC PNL TOTAL CA: CPT

## 2019-04-15 PROCEDURE — 85025 COMPLETE CBC W/AUTO DIFF WBC: CPT

## 2019-04-15 PROCEDURE — 83874 ASSAY OF MYOGLOBIN: CPT

## 2019-04-15 PROCEDURE — 80162 ASSAY OF DIGOXIN TOTAL: CPT

## 2019-04-15 PROCEDURE — 81001 URINALYSIS AUTO W/SCOPE: CPT

## 2019-04-15 PROCEDURE — 2580000003 HC RX 258: Performed by: FAMILY MEDICINE

## 2019-04-15 PROCEDURE — 80076 HEPATIC FUNCTION PANEL: CPT

## 2019-04-15 PROCEDURE — 83880 ASSAY OF NATRIURETIC PEPTIDE: CPT

## 2019-04-15 PROCEDURE — 82553 CREATINE MB FRACTION: CPT

## 2019-04-15 PROCEDURE — 83735 ASSAY OF MAGNESIUM: CPT

## 2019-04-15 PROCEDURE — 70450 CT HEAD/BRAIN W/O DYE: CPT

## 2019-04-15 PROCEDURE — 6370000000 HC RX 637 (ALT 250 FOR IP): Performed by: FAMILY MEDICINE

## 2019-04-15 PROCEDURE — 99285 EMERGENCY DEPT VISIT HI MDM: CPT

## 2019-04-15 PROCEDURE — 84484 ASSAY OF TROPONIN QUANT: CPT

## 2019-04-15 PROCEDURE — 1200000000 HC SEMI PRIVATE

## 2019-04-15 PROCEDURE — 93005 ELECTROCARDIOGRAM TRACING: CPT

## 2019-04-15 PROCEDURE — 84443 ASSAY THYROID STIM HORMONE: CPT

## 2019-04-15 PROCEDURE — 71045 X-RAY EXAM CHEST 1 VIEW: CPT

## 2019-04-15 PROCEDURE — 85610 PROTHROMBIN TIME: CPT

## 2019-04-15 PROCEDURE — 87086 URINE CULTURE/COLONY COUNT: CPT

## 2019-04-15 RX ORDER — SODIUM CHLORIDE 0.9 % (FLUSH) 0.9 %
10 SYRINGE (ML) INJECTION EVERY 12 HOURS SCHEDULED
Status: DISCONTINUED | OUTPATIENT
Start: 2019-04-15 | End: 2019-04-18 | Stop reason: HOSPADM

## 2019-04-15 RX ORDER — DIGOXIN 125 MCG
125 TABLET ORAL
Status: DISCONTINUED | OUTPATIENT
Start: 2019-04-16 | End: 2019-04-18 | Stop reason: HOSPADM

## 2019-04-15 RX ORDER — DORZOLAMIDE HCL 20 MG/ML
1 SOLUTION/ DROPS OPHTHALMIC 2 TIMES DAILY
Status: DISCONTINUED | OUTPATIENT
Start: 2019-04-15 | End: 2019-04-18 | Stop reason: HOSPADM

## 2019-04-15 RX ORDER — TIMOLOL MALEATE 5 MG/ML
1 SOLUTION/ DROPS OPHTHALMIC 2 TIMES DAILY
Status: DISCONTINUED | OUTPATIENT
Start: 2019-04-15 | End: 2019-04-18 | Stop reason: HOSPADM

## 2019-04-15 RX ORDER — DORZOLAMIDE HYDROCHLORIDE AND TIMOLOL MALEATE 20; 5 MG/ML; MG/ML
1 SOLUTION/ DROPS OPHTHALMIC 2 TIMES DAILY
COMMUNITY

## 2019-04-15 RX ORDER — SERTRALINE HYDROCHLORIDE 25 MG/1
25 TABLET, FILM COATED ORAL DAILY
COMMUNITY

## 2019-04-15 RX ORDER — SODIUM CHLORIDE 0.9 % (FLUSH) 0.9 %
10 SYRINGE (ML) INJECTION PRN
Status: DISCONTINUED | OUTPATIENT
Start: 2019-04-15 | End: 2019-04-18 | Stop reason: HOSPADM

## 2019-04-15 RX ORDER — LATANOPROST 50 UG/ML
1 SOLUTION/ DROPS OPHTHALMIC NIGHTLY
Status: DISCONTINUED | OUTPATIENT
Start: 2019-04-15 | End: 2019-04-18 | Stop reason: HOSPADM

## 2019-04-15 RX ORDER — HYDROCODONE BITARTRATE AND ACETAMINOPHEN 5; 325 MG/1; MG/1
1 TABLET ORAL EVERY 6 HOURS PRN
Status: DISCONTINUED | OUTPATIENT
Start: 2019-04-15 | End: 2019-04-18 | Stop reason: HOSPADM

## 2019-04-15 RX ORDER — DIGOXIN 125 MCG
125 TABLET ORAL
COMMUNITY

## 2019-04-15 RX ORDER — FUROSEMIDE 10 MG/ML
40 INJECTION INTRAMUSCULAR; INTRAVENOUS ONCE
Status: COMPLETED | OUTPATIENT
Start: 2019-04-15 | End: 2019-04-15

## 2019-04-15 RX ORDER — GINSENG 100 MG
CAPSULE ORAL 2 TIMES DAILY
COMMUNITY

## 2019-04-15 RX ORDER — ACETAMINOPHEN 325 MG/1
650 TABLET ORAL EVERY 4 HOURS PRN
Status: DISCONTINUED | OUTPATIENT
Start: 2019-04-15 | End: 2019-04-18 | Stop reason: HOSPADM

## 2019-04-15 RX ORDER — METOLAZONE 2.5 MG/1
2.5 TABLET ORAL DAILY
Status: ON HOLD | COMMUNITY
End: 2019-04-18 | Stop reason: HOSPADM

## 2019-04-15 RX ORDER — GINSENG 100 MG
CAPSULE ORAL 2 TIMES DAILY
Status: DISCONTINUED | OUTPATIENT
Start: 2019-04-15 | End: 2019-04-18 | Stop reason: HOSPADM

## 2019-04-15 RX ORDER — DORZOLAMIDE HYDROCHLORIDE AND TIMOLOL MALEATE 20; 5 MG/ML; MG/ML
1 SOLUTION/ DROPS OPHTHALMIC 2 TIMES DAILY
Status: DISCONTINUED | OUTPATIENT
Start: 2019-04-15 | End: 2019-04-15 | Stop reason: CLARIF

## 2019-04-15 RX ADMIN — APIXABAN 2.5 MG: 2.5 TABLET, FILM COATED ORAL at 20:25

## 2019-04-15 RX ADMIN — LATANOPROST 1 DROP: 50 SOLUTION OPHTHALMIC at 20:37

## 2019-04-15 RX ADMIN — BACITRACIN: 500 OINTMENT TOPICAL at 20:38

## 2019-04-15 RX ADMIN — SERTRALINE HYDROCHLORIDE 25 MG: 50 TABLET ORAL at 20:25

## 2019-04-15 RX ADMIN — Medication 10 ML: at 20:40

## 2019-04-15 RX ADMIN — FUROSEMIDE 40 MG: 10 INJECTION, SOLUTION INTRAMUSCULAR; INTRAVENOUS at 18:08

## 2019-04-15 RX ADMIN — TIMOLOL MALEATE 1 DROP: 5 SOLUTION OPHTHALMIC at 20:28

## 2019-04-15 ASSESSMENT — ENCOUNTER SYMPTOMS: SHORTNESS OF BREATH: 0

## 2019-04-15 ASSESSMENT — PAIN SCALES - GENERAL
PAINLEVEL_OUTOF10: 0
PAINLEVEL_OUTOF10: 0

## 2019-04-15 NOTE — PROGRESS NOTES
Patient admitted to room 2028. Patient oriented to room, bed locked in low position, siderails up x2, call light within reach, bedside table within reach. Dr. Edwin Travis notified of patient admission and orders received.

## 2019-04-15 NOTE — ED NOTES
Patient brought to ER by her Niece Lux Medeiros who is at the bedside. Patient c/o increased swelling and some pain in her bilat lower legs. Lux Waldemar states that the patient has in home care visits by physician and last week her \"water pill\" was changed d/t something that showed in her blood work. Lux Kohlerceferino states that patient has told her that she is tired more frequently and has been sleeping more. Patient bilat lower legs red, swollen, and have blisters on them. Patient states blisters have been present \"for a while\" and is unable to give a more specific time line. Patient lying in bed, call light within reach, and patient instructed to call out for assistance as needed.       Marques Robbins RN  04/15/19 [id number]

## 2019-04-15 NOTE — ED PROVIDER NOTES
[address]  eMERGENCY dEPARTMENT eNCOUnter      Pt Name: Jojo Estevez  MRN: [id number]  [place] 8/19/1919  Date of evaluation: 4/15/2019  Provider: Eugenia Lorenz MD    CHIEF COMPLAINT     Chief Complaint   Patient presents with    Leg Swelling         HISTORY OF PRESENT ILLNESS   (Location/Symptom, Timing/Onset, Context/Setting,Quality, Duration, Modifying Factors, Severity)  Note limiting factors. Jojo Estevez is a 80 y.o. female who presents to the emergency department for evaluation of worsening of her lower extremity edema. Patient has a history of atrial fibrillation and hypertension and was on escalating doses of Lasix reaching 20 mg twice a day until a couple weeks ago when she was found to have some unknown lab abnormality. Lasix was discontinued 6 days ago and she was started on metolazone 2.5 mg daily. Her niece states that her leg swelling has gotten worse since then and she has developed superficial blisters on her legs. This morning when her health aide came in, she was barely able to walk to the front door to open it. Her aide noticed that she was also mildly confused. Patient had a urinary tract infection 6 months ago. HPI    Nursing Notes werereviewed. REVIEW OF SYSTEMS    (2-9 systems for level 4, 10 or more for level 5)     Review of Systems   Constitutional: Negative for fever. Respiratory: Negative for shortness of breath. Cardiovascular: Negative for chest pain. Genitourinary: Negative for frequency. Musculoskeletal: Positive for arthralgias and gait problem. Except as noted above the remainder of the review of systems was reviewed and negative.        PAST MEDICAL HISTORY     Past Medical History:   Diagnosis Date    [facility]at[facility] fibrillation) [facility])     Chronic renal impairment, stage 4 (severe) ([facility] [address] [facility] 12/24/2018    Glaucoma     Hypertension          SURGICALHISTORY       Past Surgical History:   Procedure Laterality Date    LUMBAR SPINE SURGERY CURRENT MEDICATIONS       Current Discharge Medication List      CONTINUE these medications which have NOT CHANGED    Details   metolazone (ZAROXOLYN) 2.5 MG tablet Take 2.5 mg by mouth daily      dorzolamide-timolol (COSOPT) 22.3-6.8 MG/ML ophthalmic solution Place 1 drop into both eyes 2 times daily      bacitracin 500 UNIT/GM ointment Apply topically 2 times daily until legs healed      sertraline (ZOLOFT) 25 MG tablet Take 25 mg by mouth daily      digoxin (LANOXIN) 125 MCG tablet Take 125 mcg by mouth Five times weekly Indications: 125mcg on Mon/Tues/Thurs/Fri/Sat 125mcg on Mon/Tues/Thurs/Fri/Sat      latanoprost (XALATAN) 0.005 % ophthalmic solution Place 1 drop into both eyes nightly       apixaban (ELIQUIS) 2.5 MG TABS tablet Take 2.5 mg by mouth 2 times daily       HYDROcodone-acetaminophen (NORCO) 5-325 MG per tablet Take 1 tablet by mouth every 6 hours as needed for Pain . Qty: 120 tablet, Refills: 0      metoprolol tartrate (LOPRESSOR) 25 MG tablet Take 12.5 mg by mouth 2 times daily Take 1/2 a tab in the am and 1/2 a tablet in the pm      Misc. Devices (COMMODE BEDSIDE) MISC Use daily  Qty: 1 each, Refills: 0             ALLERGIES     Adhesive tape; Asa [aspirin]; and Sulfa antibiotics    FAMILY HISTORY     History reviewed. No pertinent family history. SOCIAL HISTORY       Social History     Socioeconomic History    Marital status:      Spouse name: None    Number of children: None    Years of education: None    Highest education level: None   Occupational History    None   Social Needs    Financial resource strain: None    Food insecurity:     Worry: None     Inability: None    Transportation needs:     Medical: None     Non-medical: None   Tobacco Use    Smoking status: Never Smoker    Smokeless tobacco: Never Used   Substance and Sexual Activity    Alcohol use:  Yes    Drug use: No    Sexual activity: Not Currently   Lifestyle    Physical activity:     Days per reviewed. DIAGNOSTIC RESULTS     EKG: All EKG's are interpreted by the Emergency Department Physician who either signs orCo-signs this chart in the absence of a cardiologist.    Atrial fibrillation with a rate of 81 beats a minute. Left axis deviation. Incomplete right bundle branch block. No acute ST elevation. Old septal infarct. RADIOLOGY:   Non-plain film images such as CT, Ultrasound and MRI are read by the radiologist. Plain radiographic images are visualized and preliminarily interpreted by the emergency physician with the below findings:    Interpretation per the Radiologist below, ifavailable at the time of this note:    CT HEAD WO CONTRAST   Final Result   No acute intracranial abnormality. XR CHEST PORTABLE   Final Result   Stable cardiomegaly without failure. Hyperinflation compatible with COPD.                ED BEDSIDE ULTRASOUND:   Performed by ED Physician - none    LABS:  Labs Reviewed   BASIC METABOLIC PANEL - Abnormal; Notable for the following components:       Result Value    BUN 36 (*)     CREATININE 1.31 (*)     Bun/Cre Ratio 27 (*)     Potassium 3.4 (*)     GFR Non- 37 (*)     GFR  45 (*)     All other components within normal limits   BRAIN NATRIURETIC PEPTIDE - Abnormal; Notable for the following components:    Pro-BNP 6,404 (*)     All other components within normal limits   CBC WITH AUTO DIFFERENTIAL - Abnormal; Notable for the following components:    RDW 16.1 (*)     Seg Neutrophils 69 (*)     Lymphocytes 17 (*)     Monocytes 11 (*)     All other components within normal limits   CK ISOENZYMES - Abnormal; Notable for the following components:    % CKMB 3.7 (*)     All other components within normal limits   TROP/MYOGLOBIN - Abnormal; Notable for the following components:    Troponin, High Sensitivity 51 (*)     Myoglobin 97 (*)     All other components within normal limits   URINE RT REFLEX TO CULTURE - Abnormal; Notable for the Diagnosis Code    Need for prophylactic vaccination and inoculation against influenza Z23    Essential hypertension I10    Chronic atrial fibrillation (HCC) I48.2    Acute on chronic combined systolic and diastolic congestive heart failure (HCC) I50.43    Gastroesophageal reflux disease without esophagitis K21.9    Keratinous cyst L72.0    [facility] I73.9    Acute cystitis without hematuria N30.00    Claudication, intermittent (HCC) I73.9    Chronic renal impairment, stage 4 (severe) ([facility] N18.4    Peripheral edema R60.9           Summation      Patient Course:  Admitted. ED Medicationsadministered this visit:    Medications   sodium chloride flush 0.9 % injection 10 mL (has no administration in time range)   sodium chloride flush 0.9 % injection 10 mL (has no administration in time range)   acetaminophen (TYLENOL) tablet 650 mg (has no administration in time range)   apixaban (ELIQUIS) tablet 2.5 mg (has no administration in time range)   bacitracin ointment (has no administration in time range)   HYDROcodone-acetaminophen (NORCO) 5-325 MG per tablet 1 tablet (has no administration in time range)   latanoprost (XALATAN) 0.005 % ophthalmic solution 1 drop (has no administration in time range)   metoprolol tartrate (LOPRESSOR) tablet 12.5 mg (has no administration in time range)   sertraline (ZOLOFT) tablet 25 mg (has no administration in time range)   digoxin (LANOXIN) tablet 125 mcg (has no administration in time range)   dorzolamide (TRUSOPT) 2 % ophthalmic solution 1 drop (has no administration in time range)   timolol (TIMOPTIC) 0.5 % ophthalmic solution 1 drop (has no administration in time range)   furosemide (LASIX) injection 40 mg (40 mg Intravenous Given 4/15/19 [id number])       New Prescriptions from this visit:    Current Discharge Medication List          Follow-up:  DO Magui Reyes [address].   [facility] [id number]  Kar 4, MD  [address] [facility] [address] [id number]              Final Impression:   1. Peripheral edema    2.  Confusion               (Please note that portions of this note were completed with a voice recognitionprogram.  Efforts were made to edit the dictations but occasionally words are mis-transcribed.)    Edie Mills MD (electronically signed)  Attending Emergency Physician            Edie Mills MD  04/15/19 [id number]

## 2019-04-15 NOTE — PROGRESS NOTES
Transitions of Care Pharmacy Service   Medication Review    The patient's list of current home medications has been reviewed. Source(s) of information: Patient, Bishnu Rosario ([phone])     Based on information provided by the above source(s), I have updated the patient's home med list as described below. I changed or updated the following medications on the patient's home medication list:  Discontinued Trusopt 2% Drops - uses Cosopt Drops  Blood Pressure Kit - pt doesn't use anymore  Furosemide 20mg - pt doesn't use  Metoprolol Tartrate 02JX - duplicate  Lumigan 3.57% - pt doesn't use      Added Bacitracin 500unit/gm Ointment - bid until legs healed  Cosopt Drops - 1 in both bid      Adjusted   Latanoprost 0.005% Drops - 1 in both nightly  Sertraline 25mg - pt takes 1 daily instead of 2      Other Notes Patient gets Eliguis 2.5mg through the mail via [employer] [address]   Patient decided on her own 2-3 months ago to drop her Sertraline 25mg from 2 daily to 1 daily   Patient states her doctor told her to cut her Metoprolol Tartrate 25mg in half and take one-half twice daily            Please feel free to call me with any questions about this encounter. Thank you. This note will be reviewed and co-signed by the Transitions of Care Pharmacist. The pharmacist will review inpatient orders and contact the physician about any discrepancies. Renu Black, pharmacy technician  Transitions of Care Pharmacy Service  Phone:  [phone]  Fax: [phone]      Electronically signed by Renu Black on 4/15/2019 at 5:07 PM       Prior to Admission medications    Medication Sig Start Date End Date Taking?  Authorizing Provider   metolazone (ZAROXOLYN) 2.5 MG tablet Take 2.5 mg by mouth daily       dorzolamide-timolol (COSOPT) 22.3-6.8 MG/ML ophthalmic solution Place 1 drop into both eyes 2 times daily       bacitracin 500 UNIT/GM ointment Apply topically 2 times daily until legs healed sertraline (ZOLOFT) 25 MG tablet Take 25 mg by mouth daily       DIGOX 125 MCG tablet TAKE ONE TABLET BY MOUTH DAILY ON MON,TUES,THURS,FRI,AND SAT       latanoprost (XALATAN) 0.005 % ophthalmic solution Place 1 drop into both eyes nightly        apixaban (ELIQUIS) 2.5 MG TABS tablet Take 2.5 mg by mouth 2 times daily        HYDROcodone-acetaminophen (NORCO) 5-325 MG per tablet Take 1 tablet by mouth every 6 hours as needed for Pain .       metoprolol tartrate (LOPRESSOR) 25 MG tablet Take 12.5 mg by mouth 2 times daily Take 1/2 a tab in the am and 1/2 a tablet in the pm       Misc.  Devices (COMMODE BEDSIDE) MISC Use daily

## 2019-04-16 LAB
ABSOLUTE EOS #: 0.2 K/UL (ref 0–0.4)
ABSOLUTE IMMATURE GRANULOCYTE: ABNORMAL K/UL (ref 0–0.3)
ABSOLUTE LYMPH #: 0.9 K/UL (ref 1–4.8)
ABSOLUTE MONO #: 0.8 K/UL (ref 0.2–0.8)
ANION GAP SERPL CALCULATED.3IONS-SCNC: 12 MMOL/L (ref 9–17)
BASOPHILS # BLD: 0 % (ref 0–2)
BASOPHILS ABSOLUTE: 0 K/UL (ref 0–0.2)
BUN BLDV-MCNC: 36 MG/DL (ref 8–23)
BUN/CREAT BLD: 22 (ref 9–20)
CALCIUM SERPL-MCNC: 8.8 MG/DL (ref 8.6–10.4)
CHLORIDE BLD-SCNC: 98 MMOL/L (ref 98–107)
CO2: 30 MMOL/L (ref 20–31)
CREAT SERPL-MCNC: 1.66 MG/DL (ref 0.5–0.9)
CULTURE: NORMAL
DIFFERENTIAL TYPE: ABNORMAL
EOSINOPHILS RELATIVE PERCENT: 2 % (ref 1–4)
GFR AFRICAN AMERICAN: 35 ML/MIN
GFR NON-AFRICAN AMERICAN: 28 ML/MIN
GFR SERPL CREATININE-BSD FRML MDRD: ABNORMAL ML/MIN/{1.73_M2}
GFR SERPL CREATININE-BSD FRML MDRD: ABNORMAL ML/MIN/{1.73_M2}
GLUCOSE BLD-MCNC: 93 MG/DL (ref 70–99)
HCT VFR BLD CALC: 39.3 % (ref 36–46)
HEMOGLOBIN: 13.2 G/DL (ref 12–16)
IMMATURE GRANULOCYTES: ABNORMAL %
LYMPHOCYTES # BLD: 13 % (ref 24–44)
Lab: NORMAL
MCH RBC QN AUTO: 31.2 PG (ref 26–34)
MCHC RBC AUTO-ENTMCNC: 33.7 G/DL (ref 31–37)
MCV RBC AUTO: 92.7 FL (ref 80–100)
MONOCYTES # BLD: 12 % (ref 1–7)
NRBC AUTOMATED: ABNORMAL PER 100 WBC
PDW BLD-RTO: 15.8 % (ref 11.5–14.5)
PLATELET # BLD: 286 K/UL (ref 130–400)
PLATELET ESTIMATE: ABNORMAL
PMV BLD AUTO: 8.2 FL (ref 6–12)
POTASSIUM SERPL-SCNC: 3.7 MMOL/L (ref 3.7–5.3)
RBC # BLD: 4.23 M/UL (ref 4–5.2)
RBC # BLD: ABNORMAL 10*6/UL
SEG NEUTROPHILS: 73 % (ref 36–66)
SEGMENTED NEUTROPHILS ABSOLUTE COUNT: 4.8 K/UL (ref 1.8–7.7)
SODIUM BLD-SCNC: 140 MMOL/L (ref 135–144)
SPECIMEN DESCRIPTION: NORMAL
WBC # BLD: 6.7 K/UL (ref 3.5–11)
WBC # BLD: ABNORMAL 10*3/UL

## 2019-04-16 PROCEDURE — 6370000000 HC RX 637 (ALT 250 FOR IP): Performed by: FAMILY MEDICINE

## 2019-04-16 PROCEDURE — 36415 COLL VENOUS BLD VENIPUNCTURE: CPT

## 2019-04-16 PROCEDURE — 97166 OT EVAL MOD COMPLEX 45 MIN: CPT

## 2019-04-16 PROCEDURE — 97535 SELF CARE MNGMENT TRAINING: CPT

## 2019-04-16 PROCEDURE — 85025 COMPLETE CBC W/AUTO DIFF WBC: CPT

## 2019-04-16 PROCEDURE — 2580000003 HC RX 258: Performed by: FAMILY MEDICINE

## 2019-04-16 PROCEDURE — 1200000000 HC SEMI PRIVATE

## 2019-04-16 PROCEDURE — 80048 BASIC METABOLIC PNL TOTAL CA: CPT

## 2019-04-16 PROCEDURE — 6360000002 HC RX W HCPCS: Performed by: FAMILY MEDICINE

## 2019-04-16 RX ORDER — FUROSEMIDE 10 MG/ML
40 INJECTION INTRAMUSCULAR; INTRAVENOUS ONCE
Status: COMPLETED | OUTPATIENT
Start: 2019-04-16 | End: 2019-04-16

## 2019-04-16 RX ADMIN — DIGOXIN 125 MCG: 125 TABLET ORAL at 08:29

## 2019-04-16 RX ADMIN — Medication 10 ML: at 08:30

## 2019-04-16 RX ADMIN — SERTRALINE HYDROCHLORIDE 25 MG: 50 TABLET ORAL at 20:41

## 2019-04-16 RX ADMIN — APIXABAN 2.5 MG: 2.5 TABLET, FILM COATED ORAL at 08:29

## 2019-04-16 RX ADMIN — TIMOLOL MALEATE 1 DROP: 5 SOLUTION OPHTHALMIC at 20:21

## 2019-04-16 RX ADMIN — BACITRACIN: 500 OINTMENT TOPICAL at 08:31

## 2019-04-16 RX ADMIN — CEFAZOLIN 500 MG: 1 INJECTION, POWDER, FOR SOLUTION INTRAMUSCULAR; INTRAVENOUS at 11:41

## 2019-04-16 RX ADMIN — DORZOLAMIDE HYDROCHLORIDE 1 DROP: 20 SOLUTION/ DROPS OPHTHALMIC at 20:21

## 2019-04-16 RX ADMIN — FUROSEMIDE 40 MG: 10 INJECTION, SOLUTION INTRAMUSCULAR; INTRAVENOUS at 11:07

## 2019-04-16 RX ADMIN — Medication 10 ML: at 20:27

## 2019-04-16 RX ADMIN — APIXABAN 2.5 MG: 2.5 TABLET, FILM COATED ORAL at 20:19

## 2019-04-16 RX ADMIN — METOPROLOL TARTRATE 12.5 MG: 25 TABLET ORAL at 08:28

## 2019-04-16 RX ADMIN — LATANOPROST 1 DROP: 50 SOLUTION OPHTHALMIC at 20:39

## 2019-04-16 RX ADMIN — METOPROLOL TARTRATE 12.5 MG: 25 TABLET ORAL at 20:21

## 2019-04-16 RX ADMIN — BACITRACIN: 500 OINTMENT TOPICAL at 20:21

## 2019-04-16 RX ADMIN — TIMOLOL MALEATE 1 DROP: 5 SOLUTION OPHTHALMIC at 08:31

## 2019-04-16 ASSESSMENT — PAIN SCALES - GENERAL
PAINLEVEL_OUTOF10: 0
PAINLEVEL_OUTOF10: 10
PAINLEVEL_OUTOF10: 0

## 2019-04-16 NOTE — FLOWSHEET NOTE
Dr. Edwin Travis arrived at the patients bedside and was updated on the patients current condition. New orders received for Ancef and Lasix, see MAR for details. Bed rest discontinued and new orders received for up as tolerated.

## 2019-04-16 NOTE — CARE COORDINATION
Case Management Initial Discharge Plan  Jett Rodriguez,         Readmission Risk              Risk of Unplanned Readmission:        13             Met with:patient to discuss discharge plans. Information verified: address, contacts, phone number, , insurance Yes  PCP: Eva Escobar DO  Date of last visit: one month    Insurance Provider: medicare and [facility]    Discharge Planning  Current Residence:  house  Living Arrangements:  YANCI Hawkins has  1 Stories/  stairs to climb  Support Systems:  Children, Family Members  Current Services PTA:   [facility] 2 hrs am and 2 hrs pm  Agency: [address] [place] ( private pay)  Patient able to perform ADL's:Assisted  DME in home:  Cane, walker  DME used to aid ambulation prior to admission:   Richa Tristan, cane  DME used during admission:  TBD    Potential Assistance Needed:  [address] [facility]: [address] Medications:  No  Does patient want to participate in local refill/ meds to beds program?  Yes    Patient agreeable to home care: Yes  Freedom of choice provided:  yes      Type of Home Care Services:  Aide Services, Nursing Services  Patient expects to be discharged to:  home or snf    Prior SNF/Rehab Placement and Facility: [facility] to SNF/Rehab: Yes  [place] of choice provided: yes   Evaluation: yes    Expected Discharge date:  19  Follow Up Appointment: Best Day/ Time:      Transportation provider: jessy Cody  Transportation arrangements needed for discharge: Yes    Discharge Plan: Met with pt and her niece Johanna Ogden at bedside. Pt admitted with peripheral edema. Receiving IV lasix. Pt lives alone in her own home. Johanna Ogden assists as needed. Has private pay [facility] 2 hrs in am and 2 hrs in pm through [facility]. Discussed pt's needs. Pt feels she will not be able to return home and be alone for several hours at home. Pt is agreeable to SNF.   Await PT/OT eval.   Per UR note, referral placed to

## 2019-04-16 NOTE — H&P
Historical Provider, MD   apixaban (ELIQUIS) 2.5 MG TABS tablet Take 2.5 mg by mouth 2 times daily    Yes Historical Provider, MD   HYDROcodone-acetaminophen (NORCO) 5-325 MG per tablet Take 1 tablet by mouth every 6 hours as needed for Pain . 10/17/17  Yes Mally Ortiz MD   metoprolol tartrate (LOPRESSOR) 25 MG tablet Take 12.5 mg by mouth 2 times daily Take 1/2 a tab in the am and 1/2 a tablet in the pm 1/25/17  Yes Autumn Hill MD   [facility]. Devices (COMMODE BEDSIDE) MISC Use daily 12/24/18   Mally Ortiz MD        Allergies:     Adhesive tape; Asa [aspirin]; and Sulfa antibiotics    Social History:     Tobacco:    reports that she has never smoked. She has never used smokeless tobacco.  Alcohol:      reports that she drinks alcohol. Drug Use:  reports that she does not use drugs. Family History:     History reviewed. No pertinent family history. Review of Systems:     Positive and Negative as described in HPI. CONSTITUTIONAL:  negative for fevers, chills, sweats, fatigue, weight loss  HEENT:  negative for vision, hearing changes, runny nose, throat pain  RESPIRATORY:  negative for shortness of breath, cough, congestion, wheezing. CARDIOVASCULAR:  negative for chest pain, palpitations.   GASTROINTESTINAL:  negative for nausea, vomiting, diarrhea, constipation, change in bowel habits, abdominal pain   GENITOURINARY:  negative for difficulty of urination, burning with urination, frequency   INTEGUMENT:  negative for rash, skin lesions, easy bruising   HEMATOLOGIC/LYMPHATIC:  negative for swelling/edema   ALLERGIC/IMMUNOLOGIC:  negative for urticaria , itching  ENDOCRINE:  negative increase in drinking, increase in urination, hot or cold intolerance  MUSCULOSKELETAL:  negative joint pains, muscle aches, swelling of joints  NEUROLOGICAL:  negative for headaches, dizziness, lightheadedness, numbness, pain, tingling extremities  BEHAVIOR/PSYCH:  negative for depression, anxiety    Physical Exam: Dose Amount NOT REPORTED     Digoxin Date Last Dose NOT REPORTED     Digoxin Dose Time NOT REPORTED    TROP/MYOGLOBIN    Collection Time: 04/15/19  1:40 PM   Result Value Ref Range    Troponin, High Sensitivity 51 (H) 0 - 14 ng/L    Troponin T NOT REPORTED <0.03 ng/mL    Troponin Interp NOT REPORTED     Myoglobin 97 (H) 25 - 58 ng/mL   Hepatic Function Panel    Collection Time: 04/15/19  1:40 PM   Result Value Ref Range    Alb 3.5 3.5 - 5.2 g/dL    Alkaline Phosphatase 98 35 - 104 U/L    ALT 13 5 - 33 U/L    AST 24 <32 U/L    Total Bilirubin 0.55 0.3 - 1.2 mg/dL    Bilirubin, Direct 0.13 <0.31 mg/dL    Bilirubin, Indirect 0.42 0.00 - 1.00 mg/dL    Total Protein 8.0 6.4 - 8.3 g/dL    Globulin NOT REPORTED 1.5 - 3.8 g/dL    Albumin/Globulin Ratio NOT REPORTED 1.0 - 2.5   Magnesium    Collection Time: 04/15/19  1:40 PM   Result Value Ref Range    Magnesium 2.4 1.6 - 2.6 mg/dL   Protime-INR    Collection Time: 04/15/19  1:40 PM   Result Value Ref Range    Protime 11.1 9.7 - 11.6 sec    INR 1.1    APTT    Collection Time: 04/15/19  1:40 PM   Result Value Ref Range    PTT 28.7 23 - 31 sec   TSH with Reflex    Collection Time: 04/15/19  1:40 PM   Result Value Ref Range    TSH 1.46 0.30 - 5.00 mIU/L   Urinalysis Reflex to Culture    Collection Time: 04/15/19  2:15 PM   Result Value Ref Range    Color, UA YELLOW YELLOW    Turbidity UA SLIGHTLY CLOUDY (A) CLEAR    Glucose, Ur NEGATIVE NEGATIVE    Bilirubin Urine NEGATIVE NEGATIVE    Ketones, Urine NEGATIVE NEGATIVE    Specific Gravity, UA 1.010 1.005 - 1.030    Urine Hgb NEGATIVE NEGATIVE    pH, UA 6.0 5.0 - 8.0    Protein, UA NEGATIVE NEGATIVE    Urobilinogen, Urine Normal Normal    Nitrite, Urine NEGATIVE NEGATIVE    Leukocyte Esterase, Urine NEGATIVE NEGATIVE    Urinalysis Comments NOT REPORTED    Microscopic Urinalysis    Collection Time: 04/15/19  2:15 PM   Result Value Ref Range    -          WBC, UA 5 TO 10 0 - 5 /HPF    RBC, UA 0 TO 2 0 - 2 /HPF    Casts UA NOT REPORTED /LPF    Crystals UA NOT REPORTED None /HPF    Epithelial Cells UA 5 TO 10 0 - 5 /HPF    Renal Epithelial, Urine NOT REPORTED 0 /HPF    Bacteria, UA RARE (A) None    Mucus, UA NOT REPORTED None    Trichomonas, UA NOT REPORTED None    Amorphous, UA 1+ (A) None    Other Observations UA NOT REPORTED NOT REQ.     Yeast, UA NOT REPORTED None   POC Glucose Fingerstick    Collection Time: 04/15/19  8:18 PM   Result Value Ref Range    POC Glucose 100 65 - 105 mg/dL   CBC with DIFF    Collection Time: 04/16/19  4:10 AM   Result Value Ref Range    WBC 6.7 3.5 - 11.0 k/uL    RBC 4.23 4.0 - 5.2 m/uL    Hemoglobin 13.2 12.0 - 16.0 g/dL    Hematocrit 39.3 36 - 46 %    MCV 92.7 80 - 100 fL    MCH 31.2 26 - 34 pg    MCHC 33.7 31 - 37 g/dL    RDW 15.8 (H) 11.5 - 14.5 %    Platelets 993 086 - 105 k/uL    MPV 8.2 6.0 - 12.0 fL    NRBC Automated NOT REPORTED per 100 WBC    Differential Type NOT REPORTED     Seg Neutrophils 73 (H) 36 - 66 %    Lymphocytes 13 (L) 24 - 44 %    Monocytes 12 (H) 1 - 7 %    Eosinophils % 2 1 - 4 %    Basophils 0 0 - 2 %    Immature Granulocytes NOT REPORTED 0 %    Segs Absolute 4.80 1.8 - 7.7 k/uL    Absolute Lymph # 0.90 (L) 1.0 - 4.8 k/uL    Absolute Mono # 0.80 0.2 - 0.8 k/uL    Absolute Eos # 0.20 0.0 - 0.4 k/uL    Basophils # 0.00 0.0 - 0.2 k/uL    Absolute Immature Granulocyte NOT REPORTED 0.00 - 0.30 k/uL    WBC Morphology NOT REPORTED     RBC Morphology NOT REPORTED     Platelet Estimate NOT REPORTED    Basic Metabolic Panel w/ Reflex to MG    Collection Time: 04/16/19  4:10 AM   Result Value Ref Range    Glucose 93 70 - 99 mg/dL    BUN 36 (H) 8 - 23 mg/dL    CREATININE 1.66 (H) 0.50 - 0.90 mg/dL    Bun/Cre Ratio 22 (H) 9 - 20    Calcium 8.8 8.6 - 10.4 mg/dL    Sodium 140 135 - 144 mmol/L    Potassium 3.7 3.7 - 5.3 mmol/L    Chloride 98 98 - 107 mmol/L    CO2 30 20 - 31 mmol/L    Anion Gap 12 9 - 17 mmol/L    GFR Non-African American 28 (L) >60 mL/min    GFR  35 (L) >60 mL/min

## 2019-04-16 NOTE — PROGRESS NOTES
Occupational Therapy  DATE: 2019    NAME: Lavina Kanner  MRN: [id number]   : 1919    Patient not seen this date for Occupational Therapy due to:  [] Blood transfusion in progress  [x] Cancel by RN- cancel eval per RN as pt with strict bedrest orders   [] Hemodialysis  []  Refusal by Patient   [] Spine Precautions   [] Strict Bedrest  [] Surgery  [] Testing      [] Other        [] PT being discontinued at this time. Patient independent. No further needs. [] PT being discontinued at this time as the patient has been transferred to hospice care. No further needs.     Dionicio Granda, OT

## 2019-04-16 NOTE — CARE COORDINATION
Social Work-met with pt and nicastillo to discuss dc plans. They are considering short term SNF for rehab. They are considering [address].  Sent referral. Lashell Garrett

## 2019-04-16 NOTE — PROGRESS NOTES
Physical Therapy    Facility/Department: [facility]  Initial Assessment    NAME: Yessy Hutchinson  : 1919  MRN: [id number]    Date of Service: 2019    Discharge Recommendations:  Subacute/Skilled Nursing Facility      Pt presented to ED on 4/15/19 for evaluation of worsening of her lower extremity edema. Patient has a history of atrial fibrillation and hypertension and was on escalating doses of Lasix reaching 20 mg twice a day until a couple weeks ago when she was found to have some unknown lab abnormality. Lasix was discontinued 6 days ago and she was started on metolazone 2.5 mg daily. Her niece states that her leg swelling has gotten worse since then and she has developed superficial blisters on her legs. This morning when her health aide came in, she was barely able to walk to the front door to open it. Her aide noticed that she was also mildly confused. Patient had a urinary tract infection 6 months ago. RN reports patient is medically stable for therapy treatment this date. Chart reviewed prior to treatment and patient is agreeable for therapy. Assessment   Body structures, Functions, Activity limitations: Decreased functional mobility ; Decreased strength;Decreased endurance;Decreased balance;Decreased safe awareness  Assessment: Pt requires continued skilled PT & is appropriate to D/C to [address] to increase independence with functional mobility, balance, safety & activity tolerance.   Pt HIGH RISK FOR FALLS & requies SNF to enable pt to return to baseline & safe D/C back home  Prognosis: Good  Decision Making: Medium Complexity  Exam: ROM, MMT, functional mobility, activity tolerance, Balance, & [employer] AM-PAC 6 Clicks Basic Mobility   Clinical Presentation: evolving  Patient Education: PT POC, functional mobility, safety awareness, prevention of sedentary complications, LE ex's & issued written pt education  REQUIRES PT FOLLOW UP: Yes  Activity Tolerance  Activity Tolerance: Patient limited by fatigue;Patient limited by endurance       Patient Diagnosis(es): The primary encounter diagnosis was Peripheral edema. A diagnosis of Confusion was also pertinent to this visit. has a past medical history of AF (atrial fibrillation) ([address] Chronic renal impairment, stage 4 (severe) ([address] Glaucoma, and Hypertension. has a past surgical history that includes Lumbar spine surgery.     Restrictions  Restrictions/Precautions  Restrictions/Precautions: General Precautions, Up as Tolerated, Fall Risk  Required Braces or Orthoses?: No  Position Activity Restriction  Other position/activity restrictions: up wtih assist, DNR-CCA  Vision/Hearing  Vision: Impaired  Vision Exceptions: Wears glasses for reading(glaucoma)  Hearing: Within functional limits     Subjective  General  Chart Reviewed: Yes  Patient assessed for rehabilitation services?: Yes  Additional Pertinent Hx: Atrial Fibrillation, CKD4, glaucoma, HTN  Response To Previous Treatment: Not applicable  General Comment  Comments: RNCecilia Both PT  Subjective  Subjective: Pt agreeable to PT  Pain Screening  Patient Currently in Pain: Denies  Vital Signs  Patient Currently in Pain: Denies       Orientation  Orientation  Overall Orientation Status: Within Functional Limits  Social/Functional History  Social/Functional History  Lives With: Alone  Type of Home: House  Home Layout: Two level(Pt lives on first floor only)  Home Access: Stairs to enter with rails  Entrance Stairs - Number of Steps: 5(1 week prior to admission, pt was able to Indep navigate stairs using rail)  Entrance Stairs - Rails: Right  Bathroom Shower/Tub: (Pt gives herself a sponge bath in kitchen)  Bathroom Toilet: Bedside commode(with handles)  Home Equipment: [place]  Receives Help From: Family, Home health, Neighbor  ADL Assistance: Independent  Homemaking Assistance: Needs assistance(Pt reports that she still cooks using stove, friends and family deliver meals/groceries)  Homemaking Responsibilities: Yes(Minimal, friends/family clean and complete laundry)  Ambulation Assistance: Independent(with RW)  Transfer Assistance: Independent(I, but difficult)  Active : No  Mode of Transportation: Friends, Family  Occupation: Retired  Additional Comments: Per janny (POA) HHA 2x per day (3 hrs am, 2 hrs pm) hours per day. Per POA, pt recently stopped taking her antidepressants w/o dr's knowledge. She thinks pt became weak from not eating and feeling depressed  Cognition     Cognition  Overall Cognitive Status: Exceptions  Arousal/Alertness: Delayed responses to stimuli  Following Commands: Follows one step commands with increased time; Follows one step commands with repetition  Attention Span: Attends with cues to redirect  Memory: Appears intact  Safety Judgement: Decreased awareness of need for assistance;Decreased awareness of need for safety  Problem Solving: Decreased awareness of errors;Assistance required to identify errors made;Assistance required to correct errors made  Insights: Decreased awareness of deficits  Initiation: Requires cues for some  Sequencing: Requires cues for some  Objective       Observation/Palpation  Posture: Fair  Observation: BLE cellulitis with open wound R lower leg, telemetry, cont pulse ox  Edema: lower legs painful & edematous      AROM RLE (degrees)  RLE AROM: WFL  AROM LLE (degrees)  LLE AROM : WFL  AROM RUE (degrees)  RUE AROM : WFL  AROM LUE (degrees)  LUE AROM : WFL  Strength RLE  Comment: 4-/5 hip  Strength LLE  Comment: 4-/5 hip  Strength RUE  Comment: see OT assessment  Strength LUE  Comment: see OT assessment  Tone RLE  RLE Tone: Normotonic  Tone LLE  LLE Tone: Normotonic  Coordination  Movements Are Fluid And Coordinated: Yes  Motor Control  Gross Motor?: WFL  Sensation  Overall Sensation Status: Impaired(N/T in legs)  Bed mobility  Bridging: Maximum assistance  Rolling to Left: Moderate assistance  Rolling to Right: Moderate assistance  Supine to Sit: Maximum assistance  Sit to Supine: Maximum assistance  Scooting: Maximal assistance  Comment: max A for cues & assist requiring extended time to complete, support/assist of LE's to sit to EOB & to return to supine   Pt sat at EOB for 15 minutes to rest after bed mobility & prepare lines for standing    Transfers  Sit to Stand: Maximum Assistance  Stand to sit: Maximum Assistance  Bed to Chair: Unable to assess  Stand Pivot Transfers: Dependent/Total  Lateral Transfers: Maximum Assistance  Comment: Ed on use of UB support for safe sit/stand, unable to come to safe stand  Ambulation  Ambulation?: No  Placed Albino Muster, attempted to  device but not able to come to safe stand            Balance  Sitting - Static: Good  Sitting - Dynamic: Good;-  Standing - Static: Poor  Standing - Dynamic: Poor;-  Exercises  Comments: Ed on  functional mobility, safety awareness, prevention of sedentary complications, LE ex's & issued written pt education   After pt sat EOB, & attempts to stand were unsuccessful,  Completed sit to supine with maxA & scoot to [facility] with maxA, LEs propped for pressure relief   & informed RN Pt is at risk for skin breakdown & needs for air mattress. All lines intact, call light within reach, and patient positioned comfortably at end of treatment. All patient needs addressed prior to ending therapy session.         Plan   Plan  Times per week: 1-2x/D,5-96D/week  Current Treatment Recommendations: Strengthening, Balance Training, Functional Mobility Training, Transfer Training, Endurance Training, Gait Training, Stair training  Safety Devices  Type of devices: Bed alarm in place, Call light within reach, Chair alarm in place, Gait belt, Patient at risk for falls, Left in chair    G-Code       OutComes Score  Gonzalez Balance Score: 3                                               AM-PAC Score  AM-PAC Inpatient Mobility Raw Score : 10  AM-PAC Inpatient T-Scale Score : 32.29  Mobility Inpatient CMS 0-100% Score: 76.75  Mobility Inpatient CMS G-Code Modifier : CL          Goals  Short term goals  Time Frame for Short term goals: 12 visits  Short term goal 1: Inc bed mobility to indep; Short term goal 2: Inc transfers including bed to chair with Steven; Short term goal 3: Inc strength to [address] standing balance to good with device to facilitate pt independence for performance of ADL's & functional mobility, & reduce fall risk; Short term goal 4: Pt able to tolerate 30-40 min of activity to include 15-20 reps of ex & functional mobility including 5 minutes of standing to facilitate activity tolerance to [facility];   Short term goal 5: Ed pt on home ex's, safety & energy principles & issue written home program;  Patient Goals   Patient goals : regain my independence       Therapy Time   Individual Concurrent Group Co-treatment   Time In  1010         Time Out  1103         Minutes  [address], PT

## 2019-04-16 NOTE — PROGRESS NOTES
meals/groceries)  Homemaking Responsibilities: Yes(Minimal, friends/family clean and complete laundry)  Ambulation Assistance: Independent(with RW)  Transfer Assistance: Independent(I, but difficult)  Active : No  Mode of Transportation: Friends, Family  Occupation: Retired  Additional Comments: Per janny (POA) HHA 2x per day (3 hrs am, 2 hrs pm) hours per day. Per POA, pt recently stopped taking her antidepressants w/o dr's knowledge. She thinks pt became weak from not eating and feeling depressed       Objective   Vision: Impaired(Glaucoma)  Vision Exceptions: Wears glasses for reading  Hearing: Within functional limits    Orientation  Overall Orientation Status: Within Functional Limits  Observation/Palpation  Observation: Wound on lateral RLE, underweight  Body Mechanics: Pt is guarded with movement d/t pain, weakness  Edema: BLE's, feet and ankles very swollen  Balance  Sitting Balance: Stand by assistance(Pt sat at EOB for 5 min to complete oral care)  Standing Balance: Unable to assess(comment)  ADL  Feeding: Setup;Minimal assistance(assist with opening containers, vc for encouragement to eat)  Grooming: Setup;Verbal cueing(min vc for sequencing, initiation, termination of activity)  UE Bathing: Moderate assistance  LE Bathing: Maximum assistance  UE Dressing: Moderate assistance  LE Dressing: Maximum assistance  Toileting: Maximum assistance(Pt used bed pan during eval)  Additional Comments: D/t dec strength and fatigue, ADLs should be completed seated  Tone RUE  RUE Tone: Normotonic  Tone LUE  LUE Tone: Normotonic  Coordination  Movements Are Fluid And Coordinated: Yes     Bed mobility  Bridging:  Moderate assistance  Rolling to Left: Moderate assistance  Rolling to Right: Moderate assistance  Supine to Sit: Maximum assistance(Assist to support BLEs, vc to use bedrails and UEs to push off bed)  Sit to Supine: Maximum assistance  Scooting: Maximal assistance(x2, pt unable to use LE for assist d/t pain)  Transfers  Transfer Comments: Pt unable to stand this day d/t pain, fatigue     Cognition  Overall Cognitive Status: Exceptions  Arousal/Alertness: Appropriate responses to stimuli  Following Commands:  Follows multistep commands with increased time  Attention Span: Appears intact  Memory: Appears intact  Safety Judgement: Good awareness of safety precautions  Problem Solving: Assistance required to correct errors made  Insights: Fully aware of deficits  Initiation: Requires cues for some  Sequencing: Requires cues for some  Cognition Comment: During oral care, pt required vc from OT for sequencing of steps and termination of activity  Perception  Overall Perceptual Status: WFL     Sensation  Overall Sensation Status: Impaired(Numbness, tingling in BLE, legs feel \"dead\" per pt, some neuropathy in fingers)        LUE AROM (degrees)  LUE AROM : WFL  RUE AROM (degrees)  RUE AROM : [facility]  LUE Strength  Gross LUE Strength: Exceptions to [facility]  LUE Strength Comment: 3+/5 BUEs                   Plan   Plan  Times per week: 5x  Times per day: Daily  Current Treatment Recommendations: Strengthening, Functional Mobility Training, Endurance Training, Patient/Caregiver Education & Training, Self-Care / ADL, Safety Education & Training    G-Code  OT G-codes  Functional Assessment Tool Used: Am-Pac  Score: 13  OutComes Score                                                  AM-PAC Score        AM-[facility] Inpatient Daily Activity Raw Score: 13  AM-PAC Inpatient ADL T-Scale Score : 32.03  ADL Inpatient CMS 0-100% Score: 63.03  ADL Inpatient CMS G-Code Modifier : CL    Goals  Short term goals  Time Frame for Short term goals: By d/c, pt. will  Short term goal 1: tolerate reassess for functional mobility  Short term goal 2: demo Min A bed mobility with proper technique in preparation for ADL transfers  Short term goal 3: demo SBA sitting balance for 10 min while completing bedside grooming tasks  Short term goal 4: verbalize importance of good nutrition and movement for maintaining strength/indep  Short term goal 5: verbalize good understanding of fall prevention techs and EC/WS strategies to increase safety in the home  Patient Goals   Patient goals : Pt would like to have less pain and be safe       Therapy Time   Individual Concurrent Group Co-treatment   Time In 1318(plus 10 min for chart review)         Time Out 1420         Minutes Carolina Blunt [address], [place]

## 2019-04-16 NOTE — FLOWSHEET NOTE
Patient receives Sacrament of the Sick (anointing) from [facility].    [facility] will follow as needed. (writer charting for [employer]     01/64/47 1341   Encounter Summary   Services provided to: Patient   [facility]   (4/16/19 anointed)   Complexity of Encounter Low   Length of Encounter 15 minutes   Sacraments   Sacrament of Sick-Anointing Anointed  (4/16/19 Fr. Terence Church)

## 2019-04-17 PROBLEM — L03.115 CELLULITIS OF RIGHT LOWER EXTREMITY: Status: ACTIVE | Noted: 2019-04-17

## 2019-04-17 LAB
-: NORMAL
ANION GAP SERPL CALCULATED.3IONS-SCNC: 13 MMOL/L (ref 9–17)
BUN BLDV-MCNC: 32 MG/DL (ref 8–23)
BUN/CREAT BLD: 23 (ref 9–20)
CALCIUM SERPL-MCNC: 8.7 MG/DL (ref 8.6–10.4)
CHLORIDE BLD-SCNC: 94 MMOL/L (ref 98–107)
CO2: 31 MMOL/L (ref 20–31)
CREAT SERPL-MCNC: 1.37 MG/DL (ref 0.5–0.9)
GFR AFRICAN AMERICAN: 43 ML/MIN
GFR NON-AFRICAN AMERICAN: 36 ML/MIN
GFR SERPL CREATININE-BSD FRML MDRD: ABNORMAL ML/MIN/{1.73_M2}
GFR SERPL CREATININE-BSD FRML MDRD: ABNORMAL ML/MIN/{1.73_M2}
GLUCOSE BLD-MCNC: 114 MG/DL (ref 70–99)
POTASSIUM SERPL-SCNC: 3.2 MMOL/L (ref 3.7–5.3)
REASON FOR REJECTION: NORMAL
SODIUM BLD-SCNC: 138 MMOL/L (ref 135–144)
ZZ NTE CLEAN UP: ORDERED TEST: NORMAL
ZZ NTE WITH NAME CLEAN UP: SPECIMEN SOURCE: NORMAL

## 2019-04-17 PROCEDURE — 6370000000 HC RX 637 (ALT 250 FOR IP): Performed by: FAMILY MEDICINE

## 2019-04-17 PROCEDURE — 80048 BASIC METABOLIC PNL TOTAL CA: CPT

## 2019-04-17 PROCEDURE — 1200000000 HC SEMI PRIVATE

## 2019-04-17 PROCEDURE — 36415 COLL VENOUS BLD VENIPUNCTURE: CPT

## 2019-04-17 PROCEDURE — 97530 THERAPEUTIC ACTIVITIES: CPT

## 2019-04-17 PROCEDURE — 6360000002 HC RX W HCPCS: Performed by: FAMILY MEDICINE

## 2019-04-17 PROCEDURE — 97162 PT EVAL MOD COMPLEX 30 MIN: CPT

## 2019-04-17 PROCEDURE — 2580000003 HC RX 258: Performed by: FAMILY MEDICINE

## 2019-04-17 PROCEDURE — 97110 THERAPEUTIC EXERCISES: CPT

## 2019-04-17 RX ORDER — FUROSEMIDE 20 MG/1
20 TABLET ORAL 2 TIMES DAILY
Status: DISCONTINUED | OUTPATIENT
Start: 2019-04-17 | End: 2019-04-18 | Stop reason: HOSPADM

## 2019-04-17 RX ORDER — POTASSIUM CHLORIDE 7.45 MG/ML
10 INJECTION INTRAVENOUS PRN
Status: DISCONTINUED | OUTPATIENT
Start: 2019-04-17 | End: 2019-04-17 | Stop reason: ALTCHOICE

## 2019-04-17 RX ORDER — POTASSIUM CHLORIDE 20 MEQ/1
40 TABLET, EXTENDED RELEASE ORAL PRN
Status: DISCONTINUED | OUTPATIENT
Start: 2019-04-17 | End: 2019-04-17 | Stop reason: ALTCHOICE

## 2019-04-17 RX ORDER — POTASSIUM CHLORIDE 20 MEQ/1
40 TABLET, EXTENDED RELEASE ORAL ONCE
Status: COMPLETED | OUTPATIENT
Start: 2019-04-17 | End: 2019-04-17

## 2019-04-17 RX ADMIN — TIMOLOL MALEATE 1 DROP: 5 SOLUTION OPHTHALMIC at 21:02

## 2019-04-17 RX ADMIN — POTASSIUM CHLORIDE 40 MEQ: 20 TABLET, EXTENDED RELEASE ORAL at 11:43

## 2019-04-17 RX ADMIN — BACITRACIN: 500 OINTMENT TOPICAL at 08:28

## 2019-04-17 RX ADMIN — DORZOLAMIDE HYDROCHLORIDE 1 DROP: 20 SOLUTION/ DROPS OPHTHALMIC at 21:02

## 2019-04-17 RX ADMIN — CEFAZOLIN 500 MG: 1 INJECTION, POWDER, FOR SOLUTION INTRAMUSCULAR; INTRAVENOUS at 00:04

## 2019-04-17 RX ADMIN — METOPROLOL TARTRATE 12.5 MG: 25 TABLET ORAL at 08:30

## 2019-04-17 RX ADMIN — FUROSEMIDE 20 MG: 20 TABLET ORAL at 11:16

## 2019-04-17 RX ADMIN — BACITRACIN: 500 OINTMENT TOPICAL at 21:02

## 2019-04-17 RX ADMIN — CEFAZOLIN 500 MG: 1 INJECTION, POWDER, FOR SOLUTION INTRAMUSCULAR; INTRAVENOUS at 12:01

## 2019-04-17 RX ADMIN — LATANOPROST 1 DROP: 50 SOLUTION OPHTHALMIC at 21:02

## 2019-04-17 RX ADMIN — APIXABAN 2.5 MG: 2.5 TABLET, FILM COATED ORAL at 21:03

## 2019-04-17 RX ADMIN — TIMOLOL MALEATE 1 DROP: 5 SOLUTION OPHTHALMIC at 08:29

## 2019-04-17 RX ADMIN — APIXABAN 2.5 MG: 2.5 TABLET, FILM COATED ORAL at 08:30

## 2019-04-17 RX ADMIN — Medication 10 ML: at 21:02

## 2019-04-17 RX ADMIN — CEFAZOLIN 500 MG: 1 INJECTION, POWDER, FOR SOLUTION INTRAMUSCULAR; INTRAVENOUS at 23:48

## 2019-04-17 RX ADMIN — Medication 10 ML: at 08:29

## 2019-04-17 RX ADMIN — SERTRALINE HYDROCHLORIDE 25 MG: 50 TABLET ORAL at 21:03

## 2019-04-17 RX ADMIN — METOPROLOL TARTRATE 12.5 MG: 25 TABLET ORAL at 21:02

## 2019-04-17 ASSESSMENT — PAIN SCALES - GENERAL
PAINLEVEL_OUTOF10: 0

## 2019-04-17 NOTE — CARE COORDINATION
nHpredict Delivery/ Inpatient Visit:   Patient/family seen: Yes and 2 daughters   Provided patient/family with copy of nHpredict tool. All questions answered at the time of visit. Informed patient/family that the nHpredict is a tool, to be used as a guide, and should not alter their currently established discharge plan. Notified Case Management of nHpredict tool delivery to patient.    [facility]   Collaboration completed with case management: Yes- writer notified Esther Schirmer of predict tool in Media and in paper chart to be included with discharge paperwork to SNF- noted

## 2019-04-17 NOTE — DISCHARGE INSTR - COC
Continuity of Care Form    Patient Name: Rody Mcfarlane   :  1919  MRN:  [id number]    Admit date:  4/15/2019  Discharge date:  19    Code Status Order: DNR-CCA   Advance Directives:   Advance Care Flowsheet Documentation     Date/Time Healthcare Directive Type of Healthcare Directive Copy in [address] Agent's Name Healthcare Agent's Phone Number    04/15/19 5660  Yes, patient has an advance directive for healthcare treatment  Durable power of  for health care;Living will  No, copy requested from other (See comment)  Next of kin  jonathon  [phone]          Admitting Physician:  Shabbir Murray DO  PCP: Shabbir Murray DO    Discharging Nurse: [address] [place] [address] Unit/Room#:   Discharging Unit Phone Number: [phone]    Emergency Contact:   Extended Emergency Contact Information  Primary Emergency Contact: Jenny Iglesias  Address: [facility] [facility] Phone: [phone]  Relation: Other  Hearing or visual needs: None  Other needs: None  Preferred language: English   needed? No  Secondary Emergency Contact: Jenny Bran [place] [address] [address]  Phone: [phone]  Relation: Niece/Nephew  Hearing or visual needs: None  Other needs: None  Preferred language: English   needed?  No    Past Surgical History:  Past Surgical History:   Procedure Laterality Date    LUMBAR SPINE SURGERY         Immunization History:   Immunization History   Administered Date(s) Administered    Influenza Virus Vaccine 2014, 2015    Influenza, High Dose (Fluzone 65 yrs and older) 10/07/2013    Influenza, Mauri Puffer, 3 Years and older, IM (Fluzone 3 yrs and older or Afluria 5 yrs and older) 2018    Influenza, Mauri Puffer, 3 yrs and older, IM, PF (Fluzone 3 yrs and older or Afluria 5 yrs and older) 10/31/2016, 10/17/2017    Pneumococcal 13-valent Conjugate Gemma Tammycris) 01/30/2017    Pneumococcal Polysaccharide ([id number]) 02/23/2015       Active Problems:  Patient Active Problem List   Diagnosis Code    Need for prophylactic vaccination and inoculation against influenza Z23    Essential hypertension I10    Chronic atrial fibrillation ([facility] I48.2    Acute on chronic combined systolic and diastolic congestive heart failure (HCC) I50.43    Gastroesophageal reflux disease without esophagitis K21.9    Keratinous cyst L72.0    PVD (peripheral vascular disease) ([facility] I73.9    Acute cystitis without hematuria N30.00    Claudication, intermittent ([facility] I73.9    Chronic renal impairment, stage 4 (severe) ([facility]) N18.4    Peripheral edema R60.9       Isolation/Infection:   Isolation          No Isolation            Nurse Assessment:  Last Vital Signs: /72   Pulse 76   Temp 97.5 °F (36.4 °C) (Temporal)   Resp 16   Ht 5' 6\" (1.676 m)   Wt 117 lb 3.2 oz (53.2 kg)   SpO2 96%   BMI 18.92 kg/m²     Last documented pain score (0-10 scale): Pain Level: 0  Last Weight:   Wt Readings from Last 1 Encounters:   04/17/19 117 lb 3.2 oz (53.2 kg)     Mental Status:  oriented, alert, coherent, logical, thought processes intact and able to concentrate and follow conversation    IV Access:  - None    Nursing Mobility/ADLs:  Walking   Assisted  Transfer  Assisted  Bathing  Assisted  Dressing  Assisted  Toileting  Assisted  Feeding  Independent needs set up   Med Admin  Assisted  Med Delivery   crushed and prefers mixed with applesauce     Wound Care Documentation and Therapy:  Wound 04/15/19 Pretibial Right; Outer (Active)   Wound Venous 4/17/2019  8:00 AM   Dressing Status Clean;Dry; Intact 4/17/2019  8:00 AM   Dressing/Treatment Open to air; Other (comment) 4/17/2019  8:00 AM   Wound Cleansed Rinsed/Irrigated with saline 4/16/2019  7:45 PM   Wound Assessment Clean;Dry;Edges well approximated 4/17/2019  8:00 AM   Drainage Amount Scant 4/17/2019  8:00 AM   Drainage Description Serosanguinous 4/17/2019  8:00 AM   Odor None 4/17/2019  8:00 AM   Culture Taken No 4/17/2019  8:00 AM   Number of days: 1        Elimination:  Continence:   · Bowel: Yes  · Bladder: occassionally incontinent, patient wears briefs   Urinary Catheter: None   Colostomy/Ileostomy/Ileal Conduit: No       Date of Last BM: 04/17/2019    Intake/Output Summary (Last 24 hours) at 4/17/2019 1003  Last data filed at 4/17/2019 0840  Gross per 24 hour   Intake 540 ml   Output 800 ml   Net -260 ml     I/O last 3 completed shifts: In: 500 [P.O.:500]  Out: 800 [Urine:800]    Safety Concerns: At Risk for Falls    Impairments/Disabilities:      Vision wears glasses occasionally, HX of glaucoma     Nutrition Therapy:  Current Nutrition Therapy:   - Oral Diet:  General, loss of appetite     Routes of Feeding: Oral  Liquids: No Restrictions  Daily Fluid Restriction: no  Last Modified Barium Swallow with Video (Video Swallowing Test): not done    Treatments at the Time of Hospital Discharge:   Respiratory Treatments: N/A  Oxygen Therapy:  is not on home oxygen therapy. Ventilator:    - No ventilator support    Rehab Therapies: Physical Therapy and Occupational Therapy  Weight Bearing Status/Restrictions: No weight bearing restirctions  Other Medical Equipment (for information only, NOT a DME order):  Cane, commode   Other Treatments:     *Venous wound on right lower extremity:  -bacitracin ointment to be applied BID. -leave site open to air. Note two blisters present on right lower extremity shin. Blister 1 is scabbed and open, no drainage. Blister 2 fluid filled and intact, no drainage. *BMP in 1 week, with results to be called to Dr. Dennis Abt office at [phone].     Patient's personal belongings (please select all that are sent with patient):  None    RN SIGNATURE:  Electronically signed by Gael Muñoz RN on 4/18/19 at 9:52 AM    CASE MANAGEMENT/SOCIAL WORK SECTION    Inpatient Status Date: ***    Readmission Risk Assessment Score:  Readmission Risk              Risk of Unplanned Readmission:        13           Discharging to Facility/ Agency   · Name: [facility]   · Address: [address]   · Phone: [phone]  · Fax: [phone]    Dialysis Facility (if applicable)   · Name:  · Address:  · Dialysis Schedule:  · Phone:  · Fax:    / signature: Electronically signed by WILLIAM Owen on 4/18/19 at 9:53 AM    PHYSICIAN SECTION    Prognosis: Fair    Condition at Discharge: Stable    Rehab Potential (if transferring to Rehab): Good    Recommended Labs or Other Treatments After Discharge: BMP in 1 week, with results to be called to Dr. Dominick Alcantara office at [phone]. Physician Certification: I certify the above information and transfer of Philippe Bailey  is necessary for the continuing treatment of the diagnosis listed and that she requires [place] for greater 30 days.      Update Admission H&P: No change in H&P    PHYSICIAN SIGNATURE:  Electronically signed by Celia Temple DO on 4/17/19 at 10:03 AM

## 2019-04-17 NOTE — PROGRESS NOTES
[facility]    Progress Note    4/17/2019    10:07 AM    Name:   Bernardo Richardson  MRN:     [id number]     [place]:      [de-identified]   Room:   2028/2028-01   Day:  2  Admit Date:  4/15/2019  1:17 PM    PCP:   Allen Roper DO  Code Status:  DNR-CCA    Subjective:     C/C:   Chief Complaint   Patient presents with    Leg Swelling     Interval History Status: improved. 1. Peripheral edema    2. Confusion        Brief History:     Feeling some better    Review of Systems:     Constitutional:  negative for chills, fevers, sweats  Respiratory:  negative for cough, dyspnea on exertion, shortness of breath, wheezing  Cardiovascular:  negative for chest pain, chest pressure/discomfort, lower extremity edema, palpitations  Gastrointestinal:  negative for abdominal pain, constipation, diarrhea, nausea, vomiting  Neurological:  negative for dizziness, headache    Medications: Allergies: Allergies   Allergen Reactions    Adhesive Tape Hives    Asa [Aspirin]     Sulfa Antibiotics        Current Meds:   Scheduled Meds:    ceFAZolin  500 mg Intravenous Q12H    sodium chloride flush  10 mL Intravenous 2 times per day    apixaban  2.5 mg Oral BID    bacitracin   Topical BID    latanoprost  1 drop Both Eyes Nightly    metoprolol tartrate  12.5 mg Oral BID    sertraline  25 mg Oral Daily    digoxin  125 mcg Oral Once per day on Mon Tue Thu Fri Sat    dorzolamide  1 drop Both Eyes BID    timolol  1 drop Both Eyes BID     Continuous Infusions:   PRN Meds: sodium chloride flush, acetaminophen, HYDROcodone-acetaminophen    Data:     Past Medical History:   has a past medical history of AF (atrial fibrillation) ([facility] [address] Chronic renal impairment, stage 4 (severe) ([facility] [address] Glaucoma, and Hypertension. Social History:   reports that she has never smoked. She has never used smokeless tobacco. She reports that she drinks alcohol. She reports that she does not use drugs. Family History: History reviewed. No pertinent family history. Vitals:  /72   Pulse 76   Temp 97.5 °F (36.4 °C) (Temporal)   Resp 16   Ht 5' 6\" (1.676 m)   Wt 117 lb 3.2 oz (53.2 kg)   SpO2 96%   BMI 18.92 kg/m²   Temp (24hrs), Av.4 °F (36.3 °C), Min:97 °F (36.1 °C), Max:97.9 °F (36.6 °C)    Recent Labs     04/15/19  2018   POCGLU 100       I/O (24Hr): Intake/Output Summary (Last 24 hours) at 2019 1007  Last data filed at 2019 0840  Gross per 24 hour   Intake 540 ml   Output 800 ml   Net -260 ml       Labs:    [unfilled]    Lab Results   Component Value Date/Time    SPECIAL NOT REPORTED 04/15/2019 02:15 PM     Lab Results   Component Value Date/Time    CULTURE NO SIGNIFICANT GROWTH 04/15/2019 02:15 PM       [unfilled]    Radiology:        Physical Examination:        General appearance:  alert, cooperative and no distress  Mental Status:  oriented to person, place and time and normal affect  Lungs:  clear to auscultation bilaterally, normal effort  Heart:  regular rate and rhythm, no murmur  Abdomen:  soft, nontender, nondistended, normal bowel sounds, no masses, hepatomegaly, splenomegaly  Extremities: small edema and erythema and warmth  Skin:  no gross lesions, rashes, induration    Assessment:        Primary Problem  <principal problem not specified>    Active Hospital Problems    Diagnosis Date Noted    Peripheral edema [R60.9] 04/15/2019       Plan:        1. D/C  IV lasix change to po  2. Follow bun/cr  3.  Await placement    Arsen Hill DO  2019  10:07 AM

## 2019-04-18 VITALS
RESPIRATION RATE: 16 BRPM | DIASTOLIC BLOOD PRESSURE: 72 MMHG | HEART RATE: 95 BPM | SYSTOLIC BLOOD PRESSURE: 129 MMHG | TEMPERATURE: 97.3 F | WEIGHT: 114.3 LBS | OXYGEN SATURATION: 96 % | BODY MASS INDEX: 18.37 KG/M2 | HEIGHT: 66 IN

## 2019-04-18 LAB
ANION GAP SERPL CALCULATED.3IONS-SCNC: 10 MMOL/L (ref 9–17)
BUN BLDV-MCNC: 31 MG/DL (ref 8–23)
BUN/CREAT BLD: 24 (ref 9–20)
CALCIUM SERPL-MCNC: 8.2 MG/DL (ref 8.6–10.4)
CHLORIDE BLD-SCNC: 95 MMOL/L (ref 98–107)
CO2: 31 MMOL/L (ref 20–31)
CREAT SERPL-MCNC: 1.28 MG/DL (ref 0.5–0.9)
GFR AFRICAN AMERICAN: 47 ML/MIN
GFR NON-AFRICAN AMERICAN: 38 ML/MIN
GFR SERPL CREATININE-BSD FRML MDRD: ABNORMAL ML/MIN/{1.73_M2}
GFR SERPL CREATININE-BSD FRML MDRD: ABNORMAL ML/MIN/{1.73_M2}
GLUCOSE BLD-MCNC: 120 MG/DL (ref 70–99)
POTASSIUM SERPL-SCNC: 3.2 MMOL/L (ref 3.7–5.3)
SODIUM BLD-SCNC: 136 MMOL/L (ref 135–144)

## 2019-04-18 PROCEDURE — 36415 COLL VENOUS BLD VENIPUNCTURE: CPT

## 2019-04-18 PROCEDURE — 6370000000 HC RX 637 (ALT 250 FOR IP): Performed by: FAMILY MEDICINE

## 2019-04-18 PROCEDURE — 2580000003 HC RX 258: Performed by: FAMILY MEDICINE

## 2019-04-18 PROCEDURE — 6360000002 HC RX W HCPCS: Performed by: FAMILY MEDICINE

## 2019-04-18 PROCEDURE — 80048 BASIC METABOLIC PNL TOTAL CA: CPT

## 2019-04-18 RX ORDER — POTASSIUM CHLORIDE 20 MEQ/1
20 TABLET, EXTENDED RELEASE ORAL
Status: DISCONTINUED | OUTPATIENT
Start: 2019-04-19 | End: 2019-04-18 | Stop reason: HOSPADM

## 2019-04-18 RX ORDER — POTASSIUM CHLORIDE 20 MEQ/1
20 TABLET, EXTENDED RELEASE ORAL
Qty: 60 TABLET | Refills: 3 | Status: SHIPPED | OUTPATIENT
Start: 2019-04-19

## 2019-04-18 RX ORDER — CEPHALEXIN 250 MG/1
250 CAPSULE ORAL 3 TIMES DAILY
Status: DISCONTINUED | OUTPATIENT
Start: 2019-04-19 | End: 2019-04-18 | Stop reason: HOSPADM

## 2019-04-18 RX ORDER — GINSENG 100 MG
CAPSULE ORAL
Qty: 30 G | Refills: 3 | Status: SHIPPED | OUTPATIENT
Start: 2019-04-18 | End: 2019-04-28

## 2019-04-18 RX ORDER — POTASSIUM CHLORIDE 20 MEQ/1
40 TABLET, EXTENDED RELEASE ORAL ONCE
Status: COMPLETED | OUTPATIENT
Start: 2019-04-18 | End: 2019-04-18

## 2019-04-18 RX ORDER — CEPHALEXIN 500 MG/1
500 CAPSULE ORAL 3 TIMES DAILY
Qty: 21 CAPSULE | Refills: 0 | Status: SHIPPED | OUTPATIENT
Start: 2019-04-19

## 2019-04-18 RX ADMIN — CEFAZOLIN 500 MG: 1 INJECTION, POWDER, FOR SOLUTION INTRAMUSCULAR; INTRAVENOUS at 10:55

## 2019-04-18 RX ADMIN — POTASSIUM CHLORIDE 40 MEQ: 20 TABLET, EXTENDED RELEASE ORAL at 10:55

## 2019-04-18 RX ADMIN — APIXABAN 2.5 MG: 2.5 TABLET, FILM COATED ORAL at 08:04

## 2019-04-18 RX ADMIN — Medication 10 ML: at 08:08

## 2019-04-18 RX ADMIN — SERTRALINE HYDROCHLORIDE 25 MG: 50 TABLET ORAL at 08:05

## 2019-04-18 RX ADMIN — DIGOXIN 125 MCG: 125 TABLET ORAL at 08:05

## 2019-04-18 RX ADMIN — METOPROLOL TARTRATE 12.5 MG: 25 TABLET ORAL at 08:04

## 2019-04-18 RX ADMIN — FUROSEMIDE 20 MG: 20 TABLET ORAL at 08:04

## 2019-04-18 RX ADMIN — TIMOLOL MALEATE 1 DROP: 5 SOLUTION OPHTHALMIC at 08:08

## 2019-04-18 RX ADMIN — BACITRACIN: 500 OINTMENT TOPICAL at 08:07

## 2019-04-18 RX ADMIN — DORZOLAMIDE HYDROCHLORIDE 1 DROP: 20 SOLUTION/ DROPS OPHTHALMIC at 08:07

## 2019-04-18 ASSESSMENT — PAIN SCALES - GENERAL
PAINLEVEL_OUTOF10: 0
PAINLEVEL_OUTOF10: 0

## 2019-04-18 NOTE — FLOWSHEET NOTE
Dr. Griselda Bell returned call. New orders received for BMP in 1 week and results to be called to Dr. Griselda Bell office.

## 2019-04-18 NOTE — FLOWSHEET NOTE
Dr. Rupert Nix arrived at the patients bedside and was updated on the patients current condition. RN reviewed potassium result of 3.2  with Dr. Rupert Nix. New orders received for potassium replacement and oral antibiotics, See MAR for details. Patient is okay for discharge to [facility].

## 2019-04-18 NOTE — FLOWSHEET NOTE
Patient in bed; engages in conversation; states \"it's not good to live this long\"; expresses sadness that \"all (her) friends have \"; expresses gratitude for care and support of her niece; expresses sadness that she will not likely to return home following this hospitalization. Patient expresses lisa in God and trust in God's presence in her life; expresses gratitude for visit from ; requests a rosary; states she will pray the rosary for the . Writer provides listening presence, supportive conversation, prayer, blessing, and encouragement. Spiritual Care will follow as needed. 19 1002   Encounter Summary   Services provided to: Patient   Referral/Consult From: [facility] Family members   Continue Visiting   (19)   Complexity of Encounter Moderate   Length of Encounter 15 minutes   Spiritual/Episcopal   Type Spiritual support   Assessment Calm; Approachable;Coping;Concerns with suffering;Questioning meaning/purpose   Intervention Active listening;Explored feelings, thoughts, concerns;Nurtured hope;Prayer;Provided reading materials/devotional materials;Sustaining presence/ Ministry of presence; End of life care; Discussed death;Discussed meaning/purpose   Outcome Comfort;Expressed gratitude;Engaged in conversation;Expressed feelings/needs/concerns; Shared reminiscences; Receptive

## 2019-04-18 NOTE — PROGRESS NOTES
RN notified Seema Villegas via telephone to give report. RN completed transfer report with Shantel Medrano RN.

## 2019-04-18 NOTE — CARE COORDINATION
Social work: Patient to [employer] to [address] [place] [address] via [employer] at [facility] [employer].  # for RN report: [phone]. GRADY needs faxed to [phone]. Informed RN and SNF of dc time, agreeable to plan. HENS completed.

## 2019-04-18 NOTE — PROGRESS NOTES
CLINICAL PHARMACY NOTE: MEDS TO [address] Select Patient?: Yes  Total # of Prescriptions Filled: 3   The following medications were delivered to the patient:  · CEPHALEXIN 500MG  · BACATRACIN 500UNIT/GM  · POTASSIUM ER 20  Total # of Interventions Completed: 2   Time Spent (min): 30    Additional Documentation:    PT HAS NO INS. CALLED PREVIOUS PHARMACY AND THEY SAID SHE PAYS CASH FOR ALL HER RXS. TOTAL CO-PAY COMES OUT TO $70.58.  PT PAID WITH CHECK

## 2019-04-18 NOTE — PLAN OF CARE
Problem: Falls - Risk of:  Goal: Will remain free from falls  Description  Will remain free from falls  Outcome: Ongoing  Goal: Absence of physical injury  Description  Absence of physical injury  Outcome: Ongoing     Problem: Skin Integrity:  Goal: Will show no infection signs and symptoms  Description  Will show no infection signs and symptoms  Outcome: Ongoing  Goal: Absence of new skin breakdown  Description  Absence of new skin breakdown  Outcome: Ongoing     Problem: Pain:  Description  Pain management should include both nonpharmacologic and pharmacologic interventions.   Goal: Pain level will decrease  Description  Pain level will decrease  Outcome: Ongoing  Goal: Control of acute pain  Description  Control of acute pain  Outcome: Ongoing  Goal: Control of chronic pain  Description  Control of chronic pain  Outcome: Ongoing
Problem: Falls - Risk of:  Goal: Will remain free from falls  Description  Will remain free from falls  Outcome: Ongoing  Goal: Absence of physical injury  Description  Absence of physical injury  Outcome: Ongoing     Problem: Skin Integrity:  Goal: Will show no infection signs and symptoms  Description  Will show no infection signs and symptoms  Outcome: Ongoing  Goal: Absence of new skin breakdown  Description  Absence of new skin breakdown  Outcome: Ongoing     Problem: Pain:  Description  Pain management should include both nonpharmacologic and pharmacologic interventions.   Goal: Pain level will decrease  Description  Pain level will decrease  Outcome: Ongoing  Goal: Control of acute pain  Description  Control of acute pain  Outcome: Ongoing  Goal: Control of chronic pain  Description  Control of chronic pain  Outcome: Ongoing
Ongoing  4/18/2019 0010 by Paola Bautista RN  Outcome: Ongoing  Goal: Control of chronic pain  Description  Control of chronic pain  4/18/2019 0023 by Paola Bautista RN  Outcome: Ongoing  4/18/2019 0010 by Paola Bautista RN  Outcome: Ongoing     Problem: IP MOBILITY  Goal: LTG - patient will demonstrate safe mobility requirements  4/18/2019 0023 by Paola Bautista RN  Outcome: Ongoing  4/18/2019 0010 by Paola Bautista RN  Outcome: Ongoing     Problem: Risk for Impaired Skin Integrity  Goal: Tissue integrity - skin and mucous membranes  Description  Structural intactness and normal physiological function of skin and  mucous membranes.   4/18/2019 0023 by Paola Bautista RN  Outcome: Ongoing  4/18/2019 0010 by Paola Bautista RN  Outcome: Ongoing

## 2019-05-15 NOTE — DISCHARGE SUMMARY
[facility]    Discharge Summary     Patient ID: Lu Tracey  :  [id number]   MRN: [id number]     ACCOUNT:  [de-identified]   Patient's PCP: Bassam Mancia DO  Admit Date: 4/15/2019   Discharge Date: 5/15/2019     Length of Stay: 3  Code Status:  Prior  Admitting Physician: Bassam Mancai DO  Discharge Physician: Bassam Mancia DO     Active Discharge Diagnoses:     Hospital Problem Lists:  Principal Problem:    Cellulitis of right lower extremity  Active Problems:    Peripheral edema  Resolved Problems:    * No resolved hospital problems. *      Admission Condition:  good     Discharged Condition: good    1. Peripheral edema    2. Confusion    3.  Cellulitis of right lower extremity          Hospital Stay:     Hospital Course:  Lu Tracey is a 80 y.o. female who was admitted for the management of   Cellulitis of right lower extremity , presented to ER with Leg Swelling          Significant therapeutic interventions:     Significant Diagnostic Studies:   Labs / Micro:  CBC:   Lab Results   Component Value Date    WBC 6.7 2019    RBC 4.23 2019    RBC 3.37 2011    HGB 13.2 2019    HCT 39.3 2019    MCV 92.7 2019    MCH 31.2 2019    MCHC 33.7 2019    RDW 15.8 2019     2019     2011     BMP:    Lab Results   Component Value Date    GLUCOSE 120 2019    GLUCOSE 97 2011     2019    K 3.2 2019    CL 95 2019    CO2 31 2019    ANIONGAP 10 2019    BUN 31 2019    CREATININE 1.28 2019    BUNCRER 24 2019    CALCIUM 8.2 2019    LABGLOM 38 2019    GFRAA 47 2019    GFR      2019    GFR NOT REPORTED 2019       ,   urine culture: negative      Radiology:    Ct Head Wo Contrast    Result Date: 4/15/2019  EXAMINATION: CT OF THE HEAD WITHOUT CONTRAST  4/15/2019 1:50 pm TECHNIQUE: CT of the head was performed without the administration of intravenous contrast. Dose modulation, iterative reconstruction, and/or weight based adjustment of the mA/kV was utilized to reduce the radiation dose to as low as reasonably achievable. COMPARISON: 01/16/2018 HISTORY: ORDERING SYSTEM PROVIDED HISTORY: new onset confusion, takes NOAC TECHNOLOGIST PROVIDED HISTORY: FINDINGS: BRAIN/VENTRICLES: There is no acute intracranial hemorrhage, mass effect or midline shift. No abnormal extra-axial fluid collection. The gray-white differentiation is maintained without evidence of an acute infarct. There is prominence of the ventricles and sulci due to global parenchymal volume loss. There are nonspecific areas of hypoattenuation within the periventricular and subcortical white matter, which likely represent chronic microvascular ischemic change. Intracranial atherosclerotic calcifications. ORBITS: The visualized portion of the orbits demonstrate no acute abnormality. SINUSES: Coastal thickening of the paranasal sinuses. Tympanomastoid cavities are clear. SOFT TISSUES/SKULL: No acute abnormality of the visualized skull or soft tissues. No acute intracranial abnormality. Xr Chest Portable    Result Date: 4/15/2019  EXAMINATION: SINGLE XRAY VIEW OF THE CHEST 4/15/2019 1:33 pm COMPARISON: 12/21/2018 HISTORY: ORDERING SYSTEM PROVIDED HISTORY: confusion, peripheral edema, evaluate for chf TECHNOLOGIST PROVIDED HISTORY: confusion, peripheral edema, evaluate for chf Ordering Physician Provided Reason for Exam: port ap upright edema Acuity: Unknown Type of Exam: Unknown FINDINGS: Rotated chest.  Heart size is enlarged. Pulmonary vascularity is within normal limits. No focal airspace consolidation, significant pleural effusion, or pneumothorax. Hyperinflated lungs. Status post lower thoracic vertebral body kyphoplasty. Bones are osteopenic. Stable cardiomegaly without failure. Hyperinflation compatible with COPD. Consultations:    Consults:     Final Specialist Recommendations/Findings:   IP CONSULT TO FAMILY MEDICINE  IP CONSULT TO SOCIAL WORK      The patient was seen and examined on day of discharge and this discharge summary is in conjunction with any daily progress note from day of discharge. Discharge plan:     Disposition: Skilled Facility    Physician Follow Up:     DO Magui Ulloa [address] [facility] [address]  Kar [address], [address] [address]             Requiring Further Evaluation/Follow Up POST HOSPITALIZATION/Incidental Findings:     Diet: regular diet    Activity: As tolerated    Instructions to Patient:     Discharge Medications:      Medication List      START taking these medications    cephALEXin 500 MG capsule  Commonly known as:  KEFLEX  Take 1 capsule by mouth 3 times daily     potassium chloride 20 MEQ extended release tablet  Commonly known as:  KLOR-CON M  Take 1 tablet by mouth daily (with breakfast)        CHANGE how you take these medications    sertraline 25 MG tablet  Commonly known as:  ZOLOFT  What changed:  Another medication with the same name was removed. Continue taking this medication, and follow the directions you see here. CONTINUE taking these medications    * bacitracin 500 UNIT/GM ointment     digoxin 125 MCG tablet  Commonly known as:  LANOXIN     dorzolamide-timolol 22.3-6.8 MG/ML ophthalmic solution  Commonly known as:  COSOPT     ELIQUIS 2.5 MG Tabs tablet  Generic drug:  apixaban     HYDROcodone-acetaminophen 5-325 MG per tablet  Commonly known as:  NORCO  Take 1 tablet by mouth every 6 hours as needed for Pain .     latanoprost 0.005 % ophthalmic solution  Commonly known as:  XALATAN     metoprolol tartrate 25 MG tablet  Commonly known as:  LOPRESSOR         * This list has 1 medication(s) that are the same as other medications prescribed for you.  Read the directions carefully, and ask your doctor or other care provider to review them with you. STOP taking these medications    Blood Pressure Kit     dorzolamide 2 % ophthalmic solution  Commonly known as:  TRUSOPT     metolazone 2.5 MG tablet  Commonly known as:  ZAROXOLYN        ASK your doctor about these medications    * bacitracin 500 UNIT/GM ointment  Apply topically 2 times daily. Ask about: Should I take this medication? Commode Bedside Misc  Use daily         * This list has 1 medication(s) that are the same as other medications prescribed for you. Read the directions carefully, and ask your doctor or other care provider to review them with you. Where to Get Your Medications      These medications were sent to [address], [facility] [phone] - f [phone]  [address] [place] [address], [place] [address]    Phone:  [phone]   · bacitracin 500 UNIT/GM ointment  · cephALEXin 500 MG capsule  · potassium chloride 20 MEQ extended release tablet         Time Spent on discharge is  32 mins in patient examination, evaluation, counseling as well as medication reconciliation, prescriptions for required medications, discharge plan and follow up. Electronically signed by   Melania Watts DO  5/15/2019  9:56 AM      Thank you Dr. Melania Watts DO for the opportunity to be involved in this patient's care.

## 2019-08-27 ENCOUNTER — TELEPHONE (OUTPATIENT)
Dept: PRIMARY CARE CLINIC | Age: 84
End: 2019-08-27

## 2020-11-03 PROBLEM — I73.9 PVD (PERIPHERAL VASCULAR DISEASE) (HCC): Status: RESOLVED | Noted: 2018-01-23 | Resolved: 2020-11-03

## 2021-08-07 ENCOUNTER — HOSPITAL ENCOUNTER (EMERGENCY)
Facility: CLINIC | Age: 86
Discharge: HOME | End: 2021-08-07
Attending: EMERGENCY MEDICINE
Payer: MEDICARE

## 2021-08-07 VITALS
WEIGHT: 118 LBS | BODY MASS INDEX: 19.05 KG/M2 | DIASTOLIC BLOOD PRESSURE: 97 MMHG | RESPIRATION RATE: 18 BRPM | OXYGEN SATURATION: 97 % | TEMPERATURE: 98.4 F | SYSTOLIC BLOOD PRESSURE: 164 MMHG | HEART RATE: 87 BPM

## 2021-08-07 DIAGNOSIS — Z48.02 ENCOUNTER FOR STAPLE REMOVAL: Primary | ICD-10-CM

## 2021-08-07 PROCEDURE — 99283 EMERGENCY DEPT VISIT LOW MDM: CPT

## 2021-08-07 ASSESSMENT — ENCOUNTER SYMPTOMS
ABDOMINAL PAIN: 0
SHORTNESS OF BREATH: 0
BACK PAIN: 1

## 2021-08-07 NOTE — ED PROVIDER NOTES
MEDICATIONS       Discharge Medication List as of 8/7/2021  4:29 PM      CONTINUE these medications which have NOT CHANGED    Details   cephALEXin (KEFLEX) 500 MG capsule Take 1 capsule by mouth 3 times daily, Disp-21 capsule, R-0Normal      potassium chloride (KLOR-CON M) 20 MEQ extended release tablet Take 1 tablet by mouth daily (with breakfast), Disp-60 tablet, R-3Normal      dorzolamide-timolol (COSOPT) 22.3-6.8 MG/ML ophthalmic solution Place 1 drop into both eyes 2 times dailyHistorical Med      bacitracin 500 UNIT/GM ointment Apply topically 2 times daily until legs healed, Topical, 2 TIMES DAILY, Historical Med      sertraline (ZOLOFT) 25 MG tablet Take 25 mg by mouth dailyHistorical Med      digoxin (LANOXIN) 125 MCG tablet Take 125 mcg by mouth Five times weekly Indications: 125mcg on Mon/Tues/Thurs/Fri/Sat 125mcg on Mon/Tues/Thurs/Fri/SatHistorical Med      Misc. Devices (COMMODE BEDSIDE) MISC Disp-1 each, R-0, PrintUse daily      latanoprost (XALATAN) 0.005 % ophthalmic solution Place 1 drop into both eyes nightly Historical Med      apixaban (ELIQUIS) 2.5 MG TABS tablet Take 2.5 mg by mouth 2 times daily Historical Med      HYDROcodone-acetaminophen (NORCO) 5-325 MG per tablet Take 1 tablet by mouth every 6 hours as needed for Pain ., Disp-120 tablet, R-0Normal      metoprolol tartrate (LOPRESSOR) 25 MG tablet Take 12.5 mg by mouth 2 times daily Take 1/2 a tab in the am and 1/2 a tablet in the pm             ALLERGIES     Adhesive tape, Asa [aspirin], and Sulfa antibiotics    FAMILY HISTORY     History reviewed. No pertinent family history. SOCIAL HISTORY       Social History     Socioeconomic History    Marital status:       Spouse name: None    Number of children: None    Years of education: None    Highest education level: None   Occupational History    None   Tobacco Use    Smoking status: Never Smoker    Smokeless tobacco: Never Used   Substance and Sexual Activity    Alcohol use: Yes    Drug use: No    Sexual activity: Not Currently   Other Topics Concern    None   Social History Narrative    None     Social Determinants of Health     Financial Resource Strain:     Difficulty of Paying Living Expenses:    Food Insecurity:     Worried About Running Out of Food in the Last Year:     Ran Out of Food in the Last Year:    Transportation Needs:     Lack of Transportation (Medical):  Lack of Transportation (Non-Medical):    Physical Activity:     Days of Exercise per Week:     Minutes of Exercise per Session:    Stress:     Feeling of Stress :    Social Connections:     Frequency of Communication with Friends and Family:     Frequency of Social Gatherings with Friends and Family:     Attends Oriental orthodox Services:     Active Member of Clubs or Organizations:     Attends Club or Organization Meetings:     Marital Status:    Intimate Partner Violence:     Fear of Current or Ex-Partner:     Emotionally Abused:     Physically Abused:     Sexually Abused:        SCREENINGS             PHYSICAL EXAM    (up to 7 for level 4, 8 or more for level 5)     ED Triage Vitals [08/07/21 1604]   BP Temp Temp src Pulse Resp SpO2 Height Weight   (!) 164/97 98.4 °F (36.9 °C) -- 87 18 97 % -- 118 lb (53.5 kg)       Physical Exam  Vitals reviewed. Constitutional:       Comments: Patient is frail-appearing, looking her stated age. She is awake and able to cooperate with the examination. Skin:     General: Skin is warm and dry. Comments: One staple was seen along the inferior edge of a healed laceration in the middle of the forehead. This one is embedded in the skin. There is no sign of surrounding infection.          DIAGNOSTIC RESULTS     EKG: All EKG's are interpreted by the Emergency Department Physician who either signs orCo-signs this chart in the absence of a cardiologist.    RADIOLOGY:     Interpretation per the Radiologist below, ifavailable at the time of this note:    No orders to display         ED BEDSIDE ULTRASOUND:   Performed by ED Physician - none    LABS:  Labs Reviewed - No data to display    All other labs were within normal range ornot returned as of this dictation. EMERGENCY DEPARTMENT COURSE and DIFFERENTIAL DIAGNOSIS/MDM:   Vitals:    Vitals:    08/07/21 1604   BP: (!) 164/97   Pulse: 87   Resp: 18   Temp: 98.4 °F (36.9 °C)   SpO2: 97%   Weight: 53.5 kg (118 lb)            MDM    CONSULTS:  None    PROCEDURES:  Unlessotherwise noted below, none     Procedures    FINAL IMPRESSION      1. Encounter for staple removal          DISPOSITION/PLAN   DISPOSITION Decision To Discharge 08/07/2021 04:25:34 PM      PATIENT REFERRED TO:  DO Magui Lyman [address] [address]  [phone]            DISCHARGE MEDICATIONS:         Problem List:  Patient Active Problem List   Diagnosis Code    Need for prophylactic vaccination and inoculation against influenza Z23    Essential hypertension I10    Chronic atrial fibrillation ([address] I48.20    Acute on chronic combined systolic and diastolic congestive heart failure (HCC) I50.43    Gastroesophageal reflux disease without esophagitis K21.9    Keratinous cyst L72.0    Acute cystitis without hematuria N30.00    Claudication, intermittent ([address] I73.9    Chronic renal impairment, stage 4 (severe) (HCC) N18.4    Peripheral edema R60.9    Cellulitis of right lower extremity L03.115           Summation      Patient Course: Discharged. ED Medicationsadministered this visit:  Medications - No data to display    New Prescriptions from this visit:    Discharge Medication List as of 8/7/2021  4:29 PM          Follow-up:  DO Magui Lyman [address]. [facility] [address]  [phone]              Final Impression:   1.  Encounter for staple removal               (Please note that portions of this note were completed with a voice recognitionprogram.  Efforts were made to edit the dictations but occasionally words are mis-transcribed.)    Lotus Bravo MD (electronically signed)  Attending Emergency Physician            Lotus Bravo MD  08/07/21 7473

## 2024-01-22 NOTE — PROGRESS NOTES
[address] [facility]    Progress Note    4/18/2019    9:27 AM    Name:   Fabiene Gottron  MRN:     [id number]     [place]:      [de-identified]   Room:   2028/2028-01   Day:  3  Admit Date:  4/15/2019  1:17 PM    PCP:   Jerrell Gomez DO  Code Status:  DNR-CCA    Subjective:     C/C:  Interval History Status: improved. 1. Peripheral edema    2. Confusion        Brief History:     Legs feel better    Review of Systems:     Constitutional:  negative for chills, fevers, sweats  Respiratory:  negative for cough, dyspnea on exertion, shortness of breath, wheezing  Cardiovascular:  negative for chest pain, chest pressure/discomfort, lower extremity edema, palpitations  Gastrointestinal:  negative for abdominal pain, constipation, diarrhea, nausea, vomiting  Neurological:  negative for dizziness, headache    Medications: Allergies: Allergies   Allergen Reactions    Adhesive Tape Hives    Asa [Aspirin]     Sulfa Antibiotics        Current Meds:   Scheduled Meds:    [START ON 4/19/2019] cephALEXin  500 mg Oral TID    [START ON 4/19/2019] potassium chloride  20 mEq Oral Daily with breakfast    potassium chloride  40 mEq Oral Once    furosemide  20 mg Oral BID    ceFAZolin  500 mg Intravenous Q12H    sodium chloride flush  10 mL Intravenous 2 times per day    apixaban  2.5 mg Oral BID    bacitracin   Topical BID    latanoprost  1 drop Both Eyes Nightly    metoprolol tartrate  12.5 mg Oral BID    sertraline  25 mg Oral Daily    digoxin  125 mcg Oral Once per day on Mon Tue Thu Fri Sat    dorzolamide  1 drop Both Eyes BID    timolol  1 drop Both Eyes BID     Continuous Infusions:   PRN Meds: sodium chloride flush, acetaminophen, HYDROcodone-acetaminophen    Data:     Past Medical History:   has a past medical history of AF (atrial fibrillation) ([facility] Chronic renal impairment, stage 4 (severe) ([facility] [address] Glaucoma, and Hypertension.     Social History:   reports that she Comments:     Last Office Visit (last PCP visit):   12/27/2023    Next Visit Date:  Future Appointments   Date Time Provider Department Center   2/27/2024 10:30 AM Evaristo Martinez MD [facility]  [facility]       **If hasn't been seen in over a year OR hasn't followed up according to last diabetes/ADHD visit, make appointment for patient before sending refill to provider.    Rx requested:  Requested Prescriptions     Pending Prescriptions Disp Refills    Methylphenidate 54 MG CR tablet 30 tablet 0     Sig: Take 1 tablet by mouth daily for 30 days. Max Daily Amount: 54 mg                has never smoked. She has never used smokeless tobacco. She reports that she drinks alcohol. She reports that she does not use drugs. Family History: History reviewed. No pertinent family history. Vitals:  /72   Pulse 95   Temp 97.7 °F (36.5 °C) (Temporal)   Resp 16   Ht 5' 6\" (1.676 m)   Wt 114 lb 4.8 oz (51.8 kg)   SpO2 96%   BMI 18.45 kg/m²   Temp (24hrs), Av °F (36.7 °C), Min:97.6 °F (36.4 °C), Max:98.7 °F (37.1 °C)    Recent Labs     04/15/19  2018   POCGLU 100       I/O (24Hr): Intake/Output Summary (Last 24 hours) at 2019 1362  Last data filed at 2019 2348  Gross per 24 hour   Intake 390 ml   Output --   Net 390 ml       Labs:    [unfilled]    Lab Results   Component Value Date/Time    SPECIAL NOT REPORTED 04/15/2019 02:15 PM     Lab Results   Component Value Date/Time    CULTURE NO SIGNIFICANT GROWTH 04/15/2019 02:15 PM       [unfilled]    Radiology:        Physical Examination:        General appearance:  alert, cooperative and no distress, frail white female  Mental Status:  oriented to person, place and time and normal affect  Lungs:  clear to auscultation bilaterally, normal effort  Heart:  regular rate and rhythm, no murmur  Abdomen:  soft, nontender, nondistended, normal bowel sounds, no masses, hepatomegaly, splenomegaly  Extremities:  no edema, redness, tenderness in the calves  Skin:  no gross lesions, rashes, induration    Assessment:        Primary Problem  <principal problem not specified>    Active Hospital Problems    Diagnosis Date Noted    Cellulitis of right lower extremity [R59.606] 2019    Peripheral edema [R60.9] 04/15/2019       Plan:        1.  Discharge to [facility] today    Elly Marshall DO  2019  9:27 AM